# Patient Record
Sex: MALE | Race: WHITE | Employment: OTHER
[De-identification: names, ages, dates, MRNs, and addresses within clinical notes are randomized per-mention and may not be internally consistent; named-entity substitution may affect disease eponyms.]

---

## 2017-01-30 ENCOUNTER — PRIOR ORIGINAL RECORDS (OUTPATIENT)
Dept: OTHER | Age: 77
End: 2017-01-30

## 2017-01-31 LAB — INR: 2.43

## 2017-02-28 ENCOUNTER — PRIOR ORIGINAL RECORDS (OUTPATIENT)
Dept: OTHER | Age: 77
End: 2017-02-28

## 2017-03-01 LAB
INR: 2.33
PROTHROMBIN TIME: 25.9 SECONDS

## 2017-03-29 ENCOUNTER — PRIOR ORIGINAL RECORDS (OUTPATIENT)
Dept: OTHER | Age: 77
End: 2017-03-29

## 2017-03-30 LAB — INR: 2.06

## 2017-04-17 ENCOUNTER — PRIOR ORIGINAL RECORDS (OUTPATIENT)
Dept: OTHER | Age: 77
End: 2017-04-17

## 2017-04-25 ENCOUNTER — PRIOR ORIGINAL RECORDS (OUTPATIENT)
Dept: OTHER | Age: 77
End: 2017-04-25

## 2017-04-26 LAB — INR: 2.66

## 2017-05-31 ENCOUNTER — PRIOR ORIGINAL RECORDS (OUTPATIENT)
Dept: OTHER | Age: 77
End: 2017-05-31

## 2017-06-01 LAB — INR: 2.17

## 2017-06-02 ENCOUNTER — PRIOR ORIGINAL RECORDS (OUTPATIENT)
Dept: OTHER | Age: 77
End: 2017-06-02

## 2017-06-09 ENCOUNTER — PRIOR ORIGINAL RECORDS (OUTPATIENT)
Dept: OTHER | Age: 77
End: 2017-06-09

## 2017-06-09 LAB
ALBUMIN: 4.1 G/DL
ALKALINE PHOSPHATATE(ALK PHOS): 212 IU/L
ALT (SGPT): 34 U/L
AST (SGOT): 23 U/L
BILIRUBIN TOTAL: 0.5 MG/DL
BUN: 19 MG/DL
CALCIUM: 9 MG/DL
CHLORIDE: 102 MEQ/L
CHOLESTEROL, TOTAL: 159 MG/DL
CREATININE, SERUM: 0.76 MG/DL
GLOBULIN: 3.5 G/DL
GLUCOSE: 102 MG/DL
GLUCOSE: 102 MG/DL
HDL CHOLESTEROL: 70 MG/DL
HEMATOCRIT: 44.2 %
HEMOGLOBIN: 14.6 G/DL
LDL CHOLESTEROL: 78 MG/DL
NON-HDL CHOLESTEROL: 89 MG/DL
PLATELETS: 146 K/UL
POTASSIUM, SERUM: 4.2 MEQ/L
PROTEIN, TOTAL: 7.6 G/DL
RED BLOOD COUNT: 4.68 X 10-6/U
SGOT (AST): 23 IU/L
SGPT (ALT): 34 IU/L
SODIUM: 139 MEQ/L
TRIGLYCERIDES: 55 MG/DL
WHITE BLOOD COUNT: 7.5 X 10-3/U

## 2017-06-28 ENCOUNTER — PRIOR ORIGINAL RECORDS (OUTPATIENT)
Dept: OTHER | Age: 77
End: 2017-06-28

## 2017-06-29 LAB
INR: 2.08
PROTHROMBIN TIME: 24 SECONDS

## 2017-07-26 ENCOUNTER — PRIOR ORIGINAL RECORDS (OUTPATIENT)
Dept: OTHER | Age: 77
End: 2017-07-26

## 2017-07-27 LAB — INR: 2.05

## 2017-08-30 ENCOUNTER — PRIOR ORIGINAL RECORDS (OUTPATIENT)
Dept: OTHER | Age: 77
End: 2017-08-30

## 2017-08-31 LAB
INR: 2.04
PROTHROMBIN TIME: 23.6 SECONDS

## 2017-09-27 ENCOUNTER — PRIOR ORIGINAL RECORDS (OUTPATIENT)
Dept: OTHER | Age: 77
End: 2017-09-27

## 2017-09-28 LAB — INR: 2.33

## 2017-11-10 ENCOUNTER — PRIOR ORIGINAL RECORDS (OUTPATIENT)
Dept: OTHER | Age: 77
End: 2017-11-10

## 2017-11-13 LAB — INR: 2.24

## 2017-12-08 ENCOUNTER — PRIOR ORIGINAL RECORDS (OUTPATIENT)
Dept: OTHER | Age: 77
End: 2017-12-08

## 2017-12-11 ENCOUNTER — PRIOR ORIGINAL RECORDS (OUTPATIENT)
Dept: OTHER | Age: 77
End: 2017-12-11

## 2017-12-12 LAB
INR: 2.83
PROTHROMBIN TIME: 30.5 SECONDS

## 2018-01-31 ENCOUNTER — PRIOR ORIGINAL RECORDS (OUTPATIENT)
Dept: OTHER | Age: 78
End: 2018-01-31

## 2018-04-20 ENCOUNTER — PRIOR ORIGINAL RECORDS (OUTPATIENT)
Dept: OTHER | Age: 78
End: 2018-04-20

## 2018-04-20 LAB — INR: 2.6

## 2018-04-23 PROBLEM — Z00.00 ADULT GENERAL MEDICAL EXAMINATION: Status: ACTIVE | Noted: 2017-07-14

## 2018-04-27 ENCOUNTER — PRIOR ORIGINAL RECORDS (OUTPATIENT)
Dept: OTHER | Age: 78
End: 2018-04-27

## 2018-04-27 LAB — INR: 2.8

## 2018-05-11 ENCOUNTER — PRIOR ORIGINAL RECORDS (OUTPATIENT)
Dept: OTHER | Age: 78
End: 2018-05-11

## 2018-05-11 LAB — INR: 3.9

## 2018-05-18 ENCOUNTER — PRIOR ORIGINAL RECORDS (OUTPATIENT)
Dept: OTHER | Age: 78
End: 2018-05-18

## 2018-05-18 LAB — INR: 3.6

## 2018-06-04 ENCOUNTER — PRIOR ORIGINAL RECORDS (OUTPATIENT)
Dept: OTHER | Age: 78
End: 2018-06-04

## 2018-06-08 ENCOUNTER — PRIOR ORIGINAL RECORDS (OUTPATIENT)
Dept: OTHER | Age: 78
End: 2018-06-08

## 2018-06-08 LAB — INR: 1.9

## 2018-06-15 ENCOUNTER — PRIOR ORIGINAL RECORDS (OUTPATIENT)
Dept: OTHER | Age: 78
End: 2018-06-15

## 2018-06-18 ENCOUNTER — PRIOR ORIGINAL RECORDS (OUTPATIENT)
Dept: OTHER | Age: 78
End: 2018-06-18

## 2018-06-18 LAB
INR: 1.72
PROTHROMBIN TIME: 20.7 SECONDS

## 2018-06-19 PROBLEM — J18.9 PNEUMONIA OF RIGHT LOWER LOBE DUE TO INFECTIOUS ORGANISM: Status: ACTIVE | Noted: 2018-06-19

## 2018-06-19 PROBLEM — I50.33 ACUTE ON CHRONIC DIASTOLIC CONGESTIVE HEART FAILURE (HCC): Status: ACTIVE | Noted: 2018-06-19

## 2018-06-25 ENCOUNTER — PRIOR ORIGINAL RECORDS (OUTPATIENT)
Dept: OTHER | Age: 78
End: 2018-06-25

## 2018-06-26 LAB
INR: 1.86
PROTHROMBIN TIME: 22 SECONDS

## 2018-07-02 ENCOUNTER — PRIOR ORIGINAL RECORDS (OUTPATIENT)
Dept: OTHER | Age: 78
End: 2018-07-02

## 2018-07-03 LAB — INR: 2.08

## 2018-07-10 LAB
CHOLESTEROL, TOTAL: 130 MG/DL
HDL CHOLESTEROL: 58 MG/DL
LDL CHOLESTEROL: 62 MG/DL
TRIGLYCERIDES: 51 MG/DL

## 2018-07-16 ENCOUNTER — PRIOR ORIGINAL RECORDS (OUTPATIENT)
Dept: OTHER | Age: 78
End: 2018-07-16

## 2018-07-17 LAB
INR: 2.41
PROTHROMBIN TIME: 26.9 SECONDS

## 2018-07-24 ENCOUNTER — PRIOR ORIGINAL RECORDS (OUTPATIENT)
Dept: OTHER | Age: 78
End: 2018-07-24

## 2018-08-07 ENCOUNTER — PRIOR ORIGINAL RECORDS (OUTPATIENT)
Dept: OTHER | Age: 78
End: 2018-08-07

## 2018-08-09 ENCOUNTER — PRIOR ORIGINAL RECORDS (OUTPATIENT)
Dept: OTHER | Age: 78
End: 2018-08-09

## 2018-08-10 LAB
INR: 1.9
PROTHROMBIN TIME: 22.6 SECONDS

## 2018-08-23 ENCOUNTER — PRIOR ORIGINAL RECORDS (OUTPATIENT)
Dept: OTHER | Age: 78
End: 2018-08-23

## 2018-08-23 PROCEDURE — 81001 URINALYSIS AUTO W/SCOPE: CPT | Performed by: INTERNAL MEDICINE

## 2018-08-23 PROCEDURE — 87086 URINE CULTURE/COLONY COUNT: CPT | Performed by: INTERNAL MEDICINE

## 2018-08-24 LAB
INR: 1.89
PROTHROMBIN TIME: 22.3 SECONDS

## 2018-09-06 ENCOUNTER — PRIOR ORIGINAL RECORDS (OUTPATIENT)
Dept: OTHER | Age: 78
End: 2018-09-06

## 2018-09-07 LAB — INR: 2.5

## 2018-10-04 ENCOUNTER — PRIOR ORIGINAL RECORDS (OUTPATIENT)
Dept: OTHER | Age: 78
End: 2018-10-04

## 2018-10-05 LAB — INR: 2.49

## 2018-10-29 ENCOUNTER — PRIOR ORIGINAL RECORDS (OUTPATIENT)
Dept: OTHER | Age: 78
End: 2018-10-29

## 2018-10-30 PROBLEM — S82.899A ANKLE FRACTURE: Status: ACTIVE | Noted: 2018-01-01

## 2018-10-30 PROBLEM — S62.109A WRIST FRACTURE: Status: ACTIVE | Noted: 2018-01-01

## 2018-10-30 PROBLEM — J18.9 PNEUMONIA OF RIGHT LOWER LOBE DUE TO INFECTIOUS ORGANISM: Status: RESOLVED | Noted: 2018-06-19 | Resolved: 2018-10-30

## 2018-10-30 LAB
INR: 2.69
PROTHROMBIN TIME: 28.1 SECONDS

## 2018-11-08 ENCOUNTER — PRIOR ORIGINAL RECORDS (OUTPATIENT)
Dept: OTHER | Age: 78
End: 2018-11-08

## 2018-11-08 PROCEDURE — 81001 URINALYSIS AUTO W/SCOPE: CPT | Performed by: INTERNAL MEDICINE

## 2018-11-28 ENCOUNTER — PRIOR ORIGINAL RECORDS (OUTPATIENT)
Dept: OTHER | Age: 78
End: 2018-11-28

## 2018-11-29 LAB — INR: 2.63

## 2018-12-05 LAB
ALT (SGPT): 37 U/L
AST (SGOT): 29 U/L
CHOLESTEROL, TOTAL: 163 MG/DL
GLUCOSE: 108 MG/DL
HDL CHOLESTEROL: 68 MG/DL
LDL CHOLESTEROL: 87 MG/DL
TRIGLYCERIDES: 39 MG/DL

## 2018-12-21 ENCOUNTER — PRIOR ORIGINAL RECORDS (OUTPATIENT)
Dept: OTHER | Age: 78
End: 2018-12-21

## 2018-12-21 ENCOUNTER — MYAURORA ACCOUNT LINK (OUTPATIENT)
Dept: OTHER | Age: 78
End: 2018-12-21

## 2019-01-17 ENCOUNTER — PRIOR ORIGINAL RECORDS (OUTPATIENT)
Dept: OTHER | Age: 79
End: 2019-01-17

## 2019-01-18 LAB — INR: 2.27

## 2019-02-21 ENCOUNTER — PRIOR ORIGINAL RECORDS (OUTPATIENT)
Dept: OTHER | Age: 79
End: 2019-02-21

## 2019-02-22 LAB
INR: 2.38
PROTHROMBIN TIME: 25.6 SECONDS

## 2019-02-28 VITALS
BODY MASS INDEX: 29.84 KG/M2 | HEIGHT: 75 IN | OXYGEN SATURATION: 91 % | DIASTOLIC BLOOD PRESSURE: 60 MMHG | WEIGHT: 240 LBS | SYSTOLIC BLOOD PRESSURE: 92 MMHG | HEART RATE: 58 BPM

## 2019-02-28 VITALS
WEIGHT: 200 LBS | DIASTOLIC BLOOD PRESSURE: 64 MMHG | SYSTOLIC BLOOD PRESSURE: 110 MMHG | BODY MASS INDEX: 24.87 KG/M2 | HEIGHT: 75 IN | HEART RATE: 60 BPM | RESPIRATION RATE: 18 BRPM

## 2019-02-28 VITALS
BODY MASS INDEX: 30.46 KG/M2 | SYSTOLIC BLOOD PRESSURE: 120 MMHG | HEART RATE: 58 BPM | WEIGHT: 245 LBS | DIASTOLIC BLOOD PRESSURE: 60 MMHG | OXYGEN SATURATION: 95 % | HEIGHT: 75 IN

## 2019-03-01 VITALS
WEIGHT: 241 LBS | HEART RATE: 64 BPM | SYSTOLIC BLOOD PRESSURE: 110 MMHG | DIASTOLIC BLOOD PRESSURE: 70 MMHG | HEIGHT: 75 IN | BODY MASS INDEX: 29.97 KG/M2

## 2019-03-21 LAB — INR PPP: 2.36

## 2019-03-22 ENCOUNTER — ANTI-COAG (OUTPATIENT)
Dept: CARDIOLOGY | Age: 79
End: 2019-03-22

## 2019-03-22 DIAGNOSIS — Z79.01 LONG TERM (CURRENT) USE OF ANTICOAGULANTS: ICD-10-CM

## 2019-03-22 DIAGNOSIS — I48.91 ATRIAL FIBRILLATION, UNSPECIFIED TYPE (CMD): ICD-10-CM

## 2019-03-22 DIAGNOSIS — Z51.81 ENCOUNTER FOR THERAPEUTIC DRUG MONITORING: ICD-10-CM

## 2019-03-22 DIAGNOSIS — I82.409 DEEP VEIN THROMBOSIS (DVT) OF LOWER EXTREMITY, UNSPECIFIED CHRONICITY, UNSPECIFIED LATERALITY, UNSPECIFIED VEIN (CMD): ICD-10-CM

## 2019-04-17 LAB — INR PPP: 2.58

## 2019-04-22 ENCOUNTER — ANTI-COAG (OUTPATIENT)
Dept: CARDIOLOGY | Age: 79
End: 2019-04-22

## 2019-04-22 DIAGNOSIS — I48.91 ATRIAL FIBRILLATION, UNSPECIFIED TYPE (CMD): ICD-10-CM

## 2019-04-22 DIAGNOSIS — Z79.01 LONG TERM (CURRENT) USE OF ANTICOAGULANTS: ICD-10-CM

## 2019-04-22 DIAGNOSIS — I82.409 DEEP VEIN THROMBOSIS (DVT) OF LOWER EXTREMITY, UNSPECIFIED CHRONICITY, UNSPECIFIED LATERALITY, UNSPECIFIED VEIN (CMD): ICD-10-CM

## 2019-04-22 DIAGNOSIS — Z51.81 ENCOUNTER FOR THERAPEUTIC DRUG MONITORING: ICD-10-CM

## 2019-05-16 LAB — INR PPP: 2.84

## 2019-05-17 ENCOUNTER — ANTI-COAG (OUTPATIENT)
Dept: CARDIOLOGY | Age: 79
End: 2019-05-17

## 2019-05-17 DIAGNOSIS — I48.91 ATRIAL FIBRILLATION, UNSPECIFIED TYPE (CMD): ICD-10-CM

## 2019-05-17 DIAGNOSIS — I82.409 DEEP VEIN THROMBOSIS (DVT) OF LOWER EXTREMITY, UNSPECIFIED CHRONICITY, UNSPECIFIED LATERALITY, UNSPECIFIED VEIN (CMD): ICD-10-CM

## 2019-05-17 DIAGNOSIS — Z79.01 LONG TERM (CURRENT) USE OF ANTICOAGULANTS: ICD-10-CM

## 2019-05-17 DIAGNOSIS — Z51.81 ENCOUNTER FOR THERAPEUTIC DRUG MONITORING: ICD-10-CM

## 2019-05-20 PROCEDURE — 36415 COLL VENOUS BLD VENIPUNCTURE: CPT | Performed by: INTERNAL MEDICINE

## 2019-05-20 PROCEDURE — 84075 ASSAY ALKALINE PHOSPHATASE: CPT | Performed by: INTERNAL MEDICINE

## 2019-05-20 PROCEDURE — 84080 ASSAY ALKALINE PHOSPHATASES: CPT | Performed by: INTERNAL MEDICINE

## 2019-05-24 RX ORDER — METOPROLOL TARTRATE 50 MG/1
TABLET, FILM COATED ORAL
Qty: 180 TABLET | Refills: 0 | Status: SHIPPED | OUTPATIENT
Start: 2019-05-24 | End: 2019-08-18 | Stop reason: SDUPTHER

## 2019-05-24 RX ORDER — DILTIAZEM HYDROCHLORIDE 240 MG/1
CAPSULE, COATED, EXTENDED RELEASE ORAL
Qty: 90 CAPSULE | Refills: 0 | Status: SHIPPED | OUTPATIENT
Start: 2019-05-24 | End: 2019-08-18 | Stop reason: SDUPTHER

## 2019-05-24 RX ORDER — DIGOXIN 250 UG/1
TABLET ORAL
Qty: 90 TABLET | Refills: 0 | Status: SHIPPED | OUTPATIENT
Start: 2019-05-24 | End: 2019-08-18 | Stop reason: SDUPTHER

## 2019-05-24 RX ORDER — WARFARIN SODIUM 2 MG/1
TABLET ORAL
Qty: 270 TABLET | Refills: 0 | Status: SHIPPED | OUTPATIENT
Start: 2019-05-24 | End: 2019-08-18 | Stop reason: SDUPTHER

## 2019-06-12 PROBLEM — Z95.2 S/P AVR (AORTIC VALVE REPLACEMENT) AND AORTOPLASTY: Status: ACTIVE | Noted: 2019-06-12

## 2019-06-13 ENCOUNTER — OFFICE VISIT (OUTPATIENT)
Dept: CARDIOLOGY | Age: 79
End: 2019-06-13

## 2019-06-13 VITALS
HEIGHT: 74 IN | BODY MASS INDEX: 30.03 KG/M2 | OXYGEN SATURATION: 95 % | HEART RATE: 75 BPM | SYSTOLIC BLOOD PRESSURE: 126 MMHG | WEIGHT: 234 LBS | DIASTOLIC BLOOD PRESSURE: 80 MMHG

## 2019-06-13 DIAGNOSIS — I48.21 PERMANENT ATRIAL FIBRILLATION (CMD): Primary | ICD-10-CM

## 2019-06-13 DIAGNOSIS — I82.409 DEEP VEIN THROMBOSIS (DVT) OF LOWER EXTREMITY, UNSPECIFIED CHRONICITY, UNSPECIFIED LATERALITY, UNSPECIFIED VEIN (CMD): ICD-10-CM

## 2019-06-13 DIAGNOSIS — E78.2 HYPERLIPIDEMIA, MIXED: ICD-10-CM

## 2019-06-13 DIAGNOSIS — Z95.2 S/P AVR (AORTIC VALVE REPLACEMENT) AND AORTOPLASTY: ICD-10-CM

## 2019-06-13 DIAGNOSIS — I10 ESSENTIAL HYPERTENSION: ICD-10-CM

## 2019-06-13 LAB — INR PPP: 3.31

## 2019-06-13 PROCEDURE — 99214 OFFICE O/P EST MOD 30 MIN: CPT | Performed by: INTERNAL MEDICINE

## 2019-06-13 RX ORDER — FUROSEMIDE 40 MG/1
40 TABLET ORAL 2 TIMES DAILY
COMMUNITY
End: 2021-05-11 | Stop reason: SDUPTHER

## 2019-06-13 RX ORDER — POTASSIUM CHLORIDE 20 MEQ/1
20 TABLET, EXTENDED RELEASE ORAL 2 TIMES DAILY
COMMUNITY
End: 2021-05-11 | Stop reason: SDUPTHER

## 2019-06-13 RX ORDER — PHENOBARBITAL 64.8 MG/1
64.8 TABLET ORAL 2 TIMES DAILY
COMMUNITY

## 2019-06-13 RX ORDER — PRAVASTATIN SODIUM 40 MG
40 TABLET ORAL DAILY
COMMUNITY

## 2019-06-13 RX ORDER — PHENYTOIN SODIUM 100 MG/1
100 CAPSULE, EXTENDED RELEASE ORAL 3 TIMES DAILY
COMMUNITY

## 2019-06-13 ASSESSMENT — ENCOUNTER SYMPTOMS
DIZZINESS: 0
SHORTNESS OF BREATH: 1
FEVER: 0
LOSS OF BALANCE: 1
SUSPICIOUS LESIONS: 0
WEIGHT LOSS: 0
COUGH: 0
CHILLS: 0
LIGHT-HEADEDNESS: 0
HEMOPTYSIS: 0
DECREASED APPETITE: 0
WEIGHT GAIN: 0
BRUISES/BLEEDS EASILY: 0

## 2019-06-17 ENCOUNTER — ANTI-COAG (OUTPATIENT)
Dept: CARDIOLOGY | Age: 79
End: 2019-06-17

## 2019-06-17 DIAGNOSIS — I48.91 ATRIAL FIBRILLATION, UNSPECIFIED TYPE (CMD): ICD-10-CM

## 2019-06-17 DIAGNOSIS — I82.A29 CHRONIC DEEP VEIN THROMBOSIS (DVT) OF AXILLARY VEIN, UNSPECIFIED LATERALITY (CMD): ICD-10-CM

## 2019-06-17 DIAGNOSIS — Z79.01 LONG TERM (CURRENT) USE OF ANTICOAGULANTS: ICD-10-CM

## 2019-06-17 DIAGNOSIS — Z51.81 ENCOUNTER FOR THERAPEUTIC DRUG MONITORING: ICD-10-CM

## 2019-06-26 LAB — INR PPP: 2.89

## 2019-06-27 ENCOUNTER — ANTI-COAG (OUTPATIENT)
Dept: CARDIOLOGY | Age: 79
End: 2019-06-27

## 2019-06-27 DIAGNOSIS — Z51.81 ENCOUNTER FOR THERAPEUTIC DRUG MONITORING: ICD-10-CM

## 2019-06-27 DIAGNOSIS — Z79.01 LONG TERM (CURRENT) USE OF ANTICOAGULANTS: ICD-10-CM

## 2019-06-27 DIAGNOSIS — I82.A29 CHRONIC DEEP VEIN THROMBOSIS (DVT) OF AXILLARY VEIN, UNSPECIFIED LATERALITY (CMD): ICD-10-CM

## 2019-06-27 DIAGNOSIS — I48.91 ATRIAL FIBRILLATION, UNSPECIFIED TYPE (CMD): ICD-10-CM

## 2019-07-25 LAB — INTERNATIONAL NORMALIZATION RATIO, POC: 2.58 (ref 2–3)

## 2019-07-26 ENCOUNTER — ANTI-COAG (OUTPATIENT)
Dept: CARDIOLOGY | Age: 79
End: 2019-07-26

## 2019-07-26 DIAGNOSIS — I82.A29 CHRONIC DEEP VEIN THROMBOSIS (DVT) OF AXILLARY VEIN, UNSPECIFIED LATERALITY (CMD): ICD-10-CM

## 2019-07-26 DIAGNOSIS — Z79.01 LONG TERM (CURRENT) USE OF ANTICOAGULANTS: ICD-10-CM

## 2019-07-26 DIAGNOSIS — I48.91 ATRIAL FIBRILLATION, UNSPECIFIED TYPE (CMD): ICD-10-CM

## 2019-07-26 DIAGNOSIS — Z51.81 ENCOUNTER FOR THERAPEUTIC DRUG MONITORING: ICD-10-CM

## 2019-07-26 PROCEDURE — X1094 POCT INR: HCPCS | Performed by: INTERNAL MEDICINE

## 2019-08-19 RX ORDER — DIGOXIN 250 UG/1
TABLET ORAL
Qty: 90 TABLET | Refills: 0 | Status: SHIPPED | OUTPATIENT
Start: 2019-08-19 | End: 2019-11-16 | Stop reason: SDUPTHER

## 2019-08-19 RX ORDER — WARFARIN SODIUM 2 MG/1
TABLET ORAL
Qty: 270 TABLET | Refills: 0 | Status: SHIPPED | OUTPATIENT
Start: 2019-08-19 | End: 2019-11-16 | Stop reason: SDUPTHER

## 2019-08-19 RX ORDER — METOPROLOL TARTRATE 50 MG/1
TABLET, FILM COATED ORAL
Qty: 180 TABLET | Refills: 0 | Status: SHIPPED | OUTPATIENT
Start: 2019-08-19 | End: 2019-11-16 | Stop reason: SDUPTHER

## 2019-08-19 RX ORDER — DILTIAZEM HYDROCHLORIDE 240 MG/1
CAPSULE, COATED, EXTENDED RELEASE ORAL
Qty: 90 CAPSULE | Refills: 0 | Status: SHIPPED | OUTPATIENT
Start: 2019-08-19 | End: 2019-11-16 | Stop reason: SDUPTHER

## 2019-08-22 LAB — INR PPP: 3.12

## 2019-08-26 ENCOUNTER — ANTI-COAG (OUTPATIENT)
Dept: CARDIOLOGY | Age: 79
End: 2019-08-26

## 2019-08-26 DIAGNOSIS — I48.91 ATRIAL FIBRILLATION, UNSPECIFIED TYPE (CMD): ICD-10-CM

## 2019-08-26 DIAGNOSIS — Z79.01 LONG TERM (CURRENT) USE OF ANTICOAGULANTS: ICD-10-CM

## 2019-08-26 DIAGNOSIS — Z51.81 ENCOUNTER FOR THERAPEUTIC DRUG MONITORING: ICD-10-CM

## 2019-09-05 LAB — INR PPP: 2.65

## 2019-09-06 ENCOUNTER — ANTI-COAG (OUTPATIENT)
Dept: CARDIOLOGY | Age: 79
End: 2019-09-06

## 2019-09-06 DIAGNOSIS — I48.91 ATRIAL FIBRILLATION, UNSPECIFIED TYPE (CMD): ICD-10-CM

## 2019-09-06 DIAGNOSIS — Z51.81 ENCOUNTER FOR THERAPEUTIC DRUG MONITORING: ICD-10-CM

## 2019-09-06 DIAGNOSIS — Z79.01 LONG TERM (CURRENT) USE OF ANTICOAGULANTS: ICD-10-CM

## 2019-09-19 LAB — INR PPP: 2.62

## 2019-09-20 ENCOUNTER — ANTI-COAG (OUTPATIENT)
Dept: CARDIOLOGY | Age: 79
End: 2019-09-20

## 2019-09-20 DIAGNOSIS — Z51.81 ENCOUNTER FOR THERAPEUTIC DRUG MONITORING: ICD-10-CM

## 2019-09-20 DIAGNOSIS — Z79.01 LONG TERM (CURRENT) USE OF ANTICOAGULANTS: ICD-10-CM

## 2019-09-20 DIAGNOSIS — I48.91 ATRIAL FIBRILLATION, UNSPECIFIED TYPE (CMD): ICD-10-CM

## 2019-10-17 LAB — INR PPP: 3.29

## 2019-10-18 ENCOUNTER — ANTI-COAG (OUTPATIENT)
Dept: CARDIOLOGY | Age: 79
End: 2019-10-18

## 2019-10-18 ENCOUNTER — TELEPHONE (OUTPATIENT)
Dept: CARDIOLOGY | Age: 79
End: 2019-10-18

## 2019-10-18 DIAGNOSIS — Z79.01 LONG TERM (CURRENT) USE OF ANTICOAGULANTS: ICD-10-CM

## 2019-10-18 DIAGNOSIS — I48.91 ATRIAL FIBRILLATION, UNSPECIFIED TYPE (CMD): ICD-10-CM

## 2019-10-18 DIAGNOSIS — Z51.81 ENCOUNTER FOR THERAPEUTIC DRUG MONITORING: ICD-10-CM

## 2019-10-24 LAB — INR PPP: 2.54

## 2019-10-25 ENCOUNTER — ANTI-COAG (OUTPATIENT)
Dept: CARDIOLOGY | Age: 79
End: 2019-10-25

## 2019-10-25 DIAGNOSIS — I48.91 ATRIAL FIBRILLATION, UNSPECIFIED TYPE (CMD): ICD-10-CM

## 2019-10-25 DIAGNOSIS — Z51.81 ENCOUNTER FOR THERAPEUTIC DRUG MONITORING: ICD-10-CM

## 2019-10-25 DIAGNOSIS — Z79.01 LONG TERM (CURRENT) USE OF ANTICOAGULANTS: ICD-10-CM

## 2019-11-12 ENCOUNTER — EXTERNAL RECORD (OUTPATIENT)
Dept: HEALTH INFORMATION MANAGEMENT | Facility: OTHER | Age: 79
End: 2019-11-12

## 2019-11-15 LAB — INR PPP: 2.25

## 2019-11-17 ENCOUNTER — EXTERNAL RECORD (OUTPATIENT)
Dept: HEALTH INFORMATION MANAGEMENT | Facility: OTHER | Age: 79
End: 2019-11-17

## 2019-11-17 PROCEDURE — 93010 ELECTROCARDIOGRAM REPORT: CPT | Performed by: INTERNAL MEDICINE

## 2019-11-18 ENCOUNTER — ANTI-COAG (OUTPATIENT)
Dept: CARDIOLOGY | Age: 79
End: 2019-11-18

## 2019-11-18 DIAGNOSIS — I48.91 ATRIAL FIBRILLATION, UNSPECIFIED TYPE (CMD): ICD-10-CM

## 2019-11-18 DIAGNOSIS — Z51.81 ENCOUNTER FOR THERAPEUTIC DRUG MONITORING: ICD-10-CM

## 2019-11-18 DIAGNOSIS — Z79.01 LONG TERM (CURRENT) USE OF ANTICOAGULANTS: ICD-10-CM

## 2019-11-18 PROCEDURE — 99222 1ST HOSP IP/OBS MODERATE 55: CPT | Performed by: INTERNAL MEDICINE

## 2019-11-18 RX ORDER — DILTIAZEM HYDROCHLORIDE 240 MG/1
CAPSULE, COATED, EXTENDED RELEASE ORAL
Qty: 90 CAPSULE | Refills: 1 | Status: SHIPPED | OUTPATIENT
Start: 2019-11-18 | End: 2019-12-05

## 2019-11-18 RX ORDER — METOPROLOL TARTRATE 50 MG/1
TABLET, FILM COATED ORAL
Qty: 180 TABLET | Refills: 1 | Status: SHIPPED | OUTPATIENT
Start: 2019-11-18 | End: 2020-05-14

## 2019-11-18 RX ORDER — DIGOXIN 250 MCG
TABLET ORAL
Qty: 90 TABLET | Refills: 1 | Status: SHIPPED | OUTPATIENT
Start: 2019-11-18 | End: 2020-05-14 | Stop reason: SDUPTHER

## 2019-11-18 RX ORDER — WARFARIN SODIUM 2 MG/1
TABLET ORAL
Qty: 270 TABLET | Refills: 1 | Status: SHIPPED | OUTPATIENT
Start: 2019-11-18 | End: 2020-04-16

## 2019-11-19 PROCEDURE — 99232 SBSQ HOSP IP/OBS MODERATE 35: CPT | Performed by: NURSE PRACTITIONER

## 2019-11-20 PROCEDURE — 93306 TTE W/DOPPLER COMPLETE: CPT | Performed by: INTERNAL MEDICINE

## 2019-11-20 PROCEDURE — 99232 SBSQ HOSP IP/OBS MODERATE 35: CPT | Performed by: INTERNAL MEDICINE

## 2019-11-21 PROCEDURE — 99232 SBSQ HOSP IP/OBS MODERATE 35: CPT | Performed by: INTERNAL MEDICINE

## 2019-11-22 PROCEDURE — 93320 DOPPLER ECHO COMPLETE: CPT | Performed by: INTERNAL MEDICINE

## 2019-11-22 PROCEDURE — 93325 DOPPLER ECHO COLOR FLOW MAPG: CPT | Performed by: INTERNAL MEDICINE

## 2019-11-22 PROCEDURE — 93312 ECHO TRANSESOPHAGEAL: CPT | Performed by: INTERNAL MEDICINE

## 2019-11-23 PROCEDURE — 99232 SBSQ HOSP IP/OBS MODERATE 35: CPT | Performed by: INTERNAL MEDICINE

## 2019-11-24 PROCEDURE — 99232 SBSQ HOSP IP/OBS MODERATE 35: CPT | Performed by: INTERNAL MEDICINE

## 2019-11-25 ENCOUNTER — APPOINTMENT (OUTPATIENT)
Dept: CARDIOLOGY | Age: 79
End: 2019-11-25

## 2019-11-25 PROCEDURE — 99232 SBSQ HOSP IP/OBS MODERATE 35: CPT | Performed by: INTERNAL MEDICINE

## 2019-11-27 ENCOUNTER — ANTI-COAG (OUTPATIENT)
Dept: CARDIOLOGY | Age: 79
End: 2019-11-27

## 2019-11-27 ENCOUNTER — TELEPHONE (OUTPATIENT)
Dept: CARDIOLOGY | Age: 79
End: 2019-11-27

## 2019-11-27 DIAGNOSIS — Z51.81 ENCOUNTER FOR THERAPEUTIC DRUG MONITORING: ICD-10-CM

## 2019-11-27 DIAGNOSIS — I48.91 ATRIAL FIBRILLATION, UNSPECIFIED TYPE (CMD): ICD-10-CM

## 2019-11-27 DIAGNOSIS — I82.4Y9 DEEP VEIN THROMBOSIS (DVT) OF PROXIMAL LOWER EXTREMITY, UNSPECIFIED CHRONICITY, UNSPECIFIED LATERALITY (CMD): ICD-10-CM

## 2019-11-27 DIAGNOSIS — Z79.01 LONG TERM (CURRENT) USE OF ANTICOAGULANTS: ICD-10-CM

## 2019-11-27 LAB — INTERNATIONAL NORMALIZATION RATIO, POC: 1.9 (ref 2–3)

## 2019-11-27 PROCEDURE — X1094 POCT INR: HCPCS | Performed by: INTERNAL MEDICINE

## 2019-12-02 ENCOUNTER — ANTI-COAG (OUTPATIENT)
Dept: CARDIOLOGY | Age: 79
End: 2019-12-02

## 2019-12-02 ENCOUNTER — TELEPHONE (OUTPATIENT)
Dept: CARDIOLOGY | Age: 79
End: 2019-12-02

## 2019-12-02 DIAGNOSIS — Z79.01 LONG TERM (CURRENT) USE OF ANTICOAGULANTS: ICD-10-CM

## 2019-12-02 DIAGNOSIS — Z51.81 ENCOUNTER FOR THERAPEUTIC DRUG MONITORING: ICD-10-CM

## 2019-12-02 LAB — INR PPP: 1.7

## 2019-12-02 RX ORDER — WARFARIN SODIUM 4 MG/1
TABLET ORAL
Qty: 90 TABLET | Refills: 3 | Status: SHIPPED | OUTPATIENT
Start: 2019-12-02 | End: 2020-04-16

## 2019-12-05 ENCOUNTER — OFFICE VISIT (OUTPATIENT)
Dept: CARDIOLOGY | Age: 79
End: 2019-12-05

## 2019-12-05 VITALS
WEIGHT: 222.6 LBS | BODY MASS INDEX: 28.57 KG/M2 | OXYGEN SATURATION: 92 % | DIASTOLIC BLOOD PRESSURE: 56 MMHG | HEART RATE: 65 BPM | HEIGHT: 74 IN | SYSTOLIC BLOOD PRESSURE: 98 MMHG

## 2019-12-05 DIAGNOSIS — Z95.2 S/P AVR (AORTIC VALVE REPLACEMENT) AND AORTOPLASTY: ICD-10-CM

## 2019-12-05 DIAGNOSIS — I27.20 PULMONARY HTN (CMD): ICD-10-CM

## 2019-12-05 DIAGNOSIS — I48.91 ATRIAL FIBRILLATION, UNSPECIFIED TYPE (CMD): Primary | ICD-10-CM

## 2019-12-05 DIAGNOSIS — E78.2 HYPERLIPIDEMIA, MIXED: ICD-10-CM

## 2019-12-05 DIAGNOSIS — I10 ESSENTIAL HYPERTENSION: ICD-10-CM

## 2019-12-05 PROCEDURE — 99214 OFFICE O/P EST MOD 30 MIN: CPT | Performed by: INTERNAL MEDICINE

## 2019-12-05 RX ORDER — TAMSULOSIN HYDROCHLORIDE 0.4 MG/1
0.4 CAPSULE ORAL
COMMUNITY
End: 2021-09-24

## 2019-12-05 ASSESSMENT — ENCOUNTER SYMPTOMS
LIGHT-HEADEDNESS: 0
WEIGHT GAIN: 0
HEMATOCHEZIA: 0
COUGH: 0
FEVER: 0
DIZZINESS: 0
HEMOPTYSIS: 0
ALLERGIC/IMMUNOLOGIC COMMENTS: NO NEW FOOD ALLERGIES
CHILLS: 0
WEIGHT LOSS: 0
SUSPICIOUS LESIONS: 0
BRUISES/BLEEDS EASILY: 0

## 2019-12-09 ENCOUNTER — ANTI-COAG (OUTPATIENT)
Dept: CARDIOLOGY | Age: 79
End: 2019-12-09

## 2019-12-09 ENCOUNTER — TELEPHONE (OUTPATIENT)
Dept: CARDIOLOGY | Age: 79
End: 2019-12-09

## 2019-12-09 DIAGNOSIS — Z51.81 ENCOUNTER FOR THERAPEUTIC DRUG MONITORING: ICD-10-CM

## 2019-12-09 DIAGNOSIS — Z79.01 LONG TERM (CURRENT) USE OF ANTICOAGULANTS: ICD-10-CM

## 2019-12-09 LAB — INR PPP: 1.6

## 2019-12-16 ENCOUNTER — ANTI-COAG (OUTPATIENT)
Dept: CARDIOLOGY | Age: 79
End: 2019-12-16

## 2019-12-16 ENCOUNTER — TELEPHONE (OUTPATIENT)
Dept: CARDIOLOGY | Age: 79
End: 2019-12-16

## 2019-12-16 DIAGNOSIS — Z79.01 LONG TERM (CURRENT) USE OF ANTICOAGULANTS: ICD-10-CM

## 2019-12-16 DIAGNOSIS — Z51.81 ENCOUNTER FOR THERAPEUTIC DRUG MONITORING: ICD-10-CM

## 2019-12-16 LAB — INR PPP: 1.9

## 2019-12-23 ENCOUNTER — ANTI-COAG (OUTPATIENT)
Dept: CARDIOLOGY | Age: 79
End: 2019-12-23

## 2019-12-23 ENCOUNTER — TELEPHONE (OUTPATIENT)
Dept: CARDIOLOGY | Age: 79
End: 2019-12-23

## 2019-12-23 DIAGNOSIS — Z51.81 ENCOUNTER FOR THERAPEUTIC DRUG MONITORING: ICD-10-CM

## 2019-12-23 DIAGNOSIS — Z79.01 LONG TERM (CURRENT) USE OF ANTICOAGULANTS: ICD-10-CM

## 2019-12-23 LAB — INR PPP: 2

## 2019-12-30 ENCOUNTER — TELEPHONE (OUTPATIENT)
Dept: CARDIOLOGY | Age: 79
End: 2019-12-30

## 2019-12-30 ENCOUNTER — ANTI-COAG (OUTPATIENT)
Dept: CARDIOLOGY | Age: 79
End: 2019-12-30

## 2019-12-30 DIAGNOSIS — Z79.01 LONG TERM (CURRENT) USE OF ANTICOAGULANTS: ICD-10-CM

## 2019-12-30 DIAGNOSIS — Z51.81 ENCOUNTER FOR THERAPEUTIC DRUG MONITORING: ICD-10-CM

## 2019-12-30 LAB — INR PPP: 2.8

## 2020-01-01 ENCOUNTER — EXTERNAL RECORD (OUTPATIENT)
Dept: HEALTH INFORMATION MANAGEMENT | Facility: OTHER | Age: 80
End: 2020-01-01

## 2020-01-02 ENCOUNTER — TELEPHONE (OUTPATIENT)
Dept: CARDIOLOGY | Age: 80
End: 2020-01-02

## 2020-01-02 DIAGNOSIS — Z79.01 ANTICOAGULATED ON COUMADIN: Primary | ICD-10-CM

## 2020-01-02 DIAGNOSIS — I48.19 PERSISTENT ATRIAL FIBRILLATION (CMD): ICD-10-CM

## 2020-01-07 ENCOUNTER — ANTI-COAG (OUTPATIENT)
Dept: CARDIOLOGY | Age: 80
End: 2020-01-07

## 2020-01-07 ENCOUNTER — TELEPHONE (OUTPATIENT)
Dept: CARDIOLOGY | Age: 80
End: 2020-01-07

## 2020-01-07 DIAGNOSIS — Z51.81 ENCOUNTER FOR THERAPEUTIC DRUG MONITORING: ICD-10-CM

## 2020-01-07 DIAGNOSIS — Z79.01 LONG TERM (CURRENT) USE OF ANTICOAGULANTS: ICD-10-CM

## 2020-01-07 LAB — INR PPP: 2.1

## 2020-01-21 LAB — INR PPP: 2.6

## 2020-01-22 ENCOUNTER — ANTI-COAG (OUTPATIENT)
Dept: CARDIOLOGY | Age: 80
End: 2020-01-22

## 2020-01-22 DIAGNOSIS — I48.91 ATRIAL FIBRILLATION, UNSPECIFIED TYPE (CMD): ICD-10-CM

## 2020-01-22 DIAGNOSIS — Z51.81 ENCOUNTER FOR THERAPEUTIC DRUG MONITORING: ICD-10-CM

## 2020-01-22 DIAGNOSIS — Z79.01 LONG TERM (CURRENT) USE OF ANTICOAGULANTS: ICD-10-CM

## 2020-02-17 ENCOUNTER — TELEPHONE (OUTPATIENT)
Dept: CARDIOLOGY | Age: 80
End: 2020-02-17

## 2020-02-18 LAB — INR PPP: 2.39 (ref 2–3)

## 2020-02-20 ENCOUNTER — ANTI-COAG (OUTPATIENT)
Dept: CARDIOLOGY | Age: 80
End: 2020-02-20

## 2020-02-20 ENCOUNTER — TELEPHONE (OUTPATIENT)
Dept: CARDIOLOGY | Age: 80
End: 2020-02-20

## 2020-02-20 DIAGNOSIS — Z79.01 LONG TERM (CURRENT) USE OF ANTICOAGULANTS: ICD-10-CM

## 2020-02-20 DIAGNOSIS — Z51.81 ENCOUNTER FOR THERAPEUTIC DRUG MONITORING: ICD-10-CM

## 2020-02-20 DIAGNOSIS — I48.91 ATRIAL FIBRILLATION, UNSPECIFIED TYPE (CMD): ICD-10-CM

## 2020-03-31 PROCEDURE — 93010 ELECTROCARDIOGRAM REPORT: CPT | Performed by: INTERNAL MEDICINE

## 2020-04-01 PROCEDURE — 99222 1ST HOSP IP/OBS MODERATE 55: CPT | Performed by: INTERNAL MEDICINE

## 2020-04-02 PROCEDURE — 99232 SBSQ HOSP IP/OBS MODERATE 35: CPT | Performed by: INTERNAL MEDICINE

## 2020-04-04 PROCEDURE — 99232 SBSQ HOSP IP/OBS MODERATE 35: CPT | Performed by: INTERNAL MEDICINE

## 2020-04-05 PROCEDURE — 99232 SBSQ HOSP IP/OBS MODERATE 35: CPT | Performed by: INTERNAL MEDICINE

## 2020-04-06 PROCEDURE — 99232 SBSQ HOSP IP/OBS MODERATE 35: CPT | Performed by: INTERNAL MEDICINE

## 2020-04-07 PROCEDURE — 99232 SBSQ HOSP IP/OBS MODERATE 35: CPT | Performed by: INTERNAL MEDICINE

## 2020-04-08 PROCEDURE — 99232 SBSQ HOSP IP/OBS MODERATE 35: CPT | Performed by: INTERNAL MEDICINE

## 2020-04-14 ENCOUNTER — TELEPHONE (OUTPATIENT)
Dept: CARDIOLOGY | Age: 80
End: 2020-04-14

## 2020-04-16 ASSESSMENT — ENCOUNTER SYMPTOMS
BRUISES/BLEEDS EASILY: 0
HEMOPTYSIS: 0
COUGH: 0
LIGHT-HEADEDNESS: 0
SUSPICIOUS LESIONS: 0
HEMATOCHEZIA: 0
CHILLS: 0
WEIGHT GAIN: 0
WEIGHT LOSS: 0
FEVER: 0
WEAKNESS: 0
DIZZINESS: 0

## 2020-04-17 ENCOUNTER — TELEPHONE (OUTPATIENT)
Dept: CARDIOLOGY | Age: 80
End: 2020-04-17

## 2020-04-17 ENCOUNTER — OFFICE VISIT (OUTPATIENT)
Dept: CARDIOLOGY | Age: 80
End: 2020-04-17

## 2020-04-17 DIAGNOSIS — I48.91 ATRIAL FIBRILLATION, UNSPECIFIED TYPE (CMD): ICD-10-CM

## 2020-04-17 DIAGNOSIS — E78.2 HYPERLIPIDEMIA, MIXED: ICD-10-CM

## 2020-04-17 DIAGNOSIS — Z95.2 S/P AVR (AORTIC VALVE REPLACEMENT) AND AORTOPLASTY: ICD-10-CM

## 2020-04-17 DIAGNOSIS — I27.20 PULMONARY HTN (CMD): Primary | ICD-10-CM

## 2020-04-17 DIAGNOSIS — I10 ESSENTIAL HYPERTENSION: ICD-10-CM

## 2020-04-17 PROCEDURE — 99441 TELEPHONE E&M BY PHYSICIAN EST PT NOT ORIG PREV 7 DAYS 5-10 MIN: CPT | Performed by: INTERNAL MEDICINE

## 2020-04-21 ENCOUNTER — TELEPHONE (OUTPATIENT)
Dept: CARDIOLOGY | Age: 80
End: 2020-04-21

## 2020-04-23 ENCOUNTER — APPOINTMENT (OUTPATIENT)
Dept: CARDIOLOGY | Age: 80
End: 2020-04-23

## 2020-04-24 ENCOUNTER — OFFICE VISIT (OUTPATIENT)
Dept: CARDIOLOGY | Age: 80
End: 2020-04-24

## 2020-04-24 VITALS
DIASTOLIC BLOOD PRESSURE: 70 MMHG | WEIGHT: 216 LBS | SYSTOLIC BLOOD PRESSURE: 111 MMHG | HEIGHT: 74 IN | HEART RATE: 83 BPM | BODY MASS INDEX: 27.72 KG/M2 | OXYGEN SATURATION: 96 %

## 2020-04-24 DIAGNOSIS — I10 ESSENTIAL HYPERTENSION: ICD-10-CM

## 2020-04-24 DIAGNOSIS — I48.20 CHRONIC ATRIAL FIBRILLATION (CMD): Primary | ICD-10-CM

## 2020-04-24 PROCEDURE — 99443 TELEPHONE E&M BY PHYSICIAN EST PT NOT ORIG PREV 7 DAYS 21-30 MIN: CPT | Performed by: INTERNAL MEDICINE

## 2020-04-24 RX ORDER — BETHANECHOL CHLORIDE 25 MG/1
TABLET ORAL
Refills: 2 | COMMUNITY
Start: 2020-02-06 | End: 2020-04-24 | Stop reason: CLARIF

## 2020-04-24 ASSESSMENT — PATIENT HEALTH QUESTIONNAIRE - PHQ9
2. FEELING DOWN, DEPRESSED OR HOPELESS: NOT AT ALL
1. LITTLE INTEREST OR PLEASURE IN DOING THINGS: NOT AT ALL
SUM OF ALL RESPONSES TO PHQ9 QUESTIONS 1 AND 2: 0
SUM OF ALL RESPONSES TO PHQ9 QUESTIONS 1 AND 2: 0

## 2020-04-30 ENCOUNTER — EXTERNAL RECORD (OUTPATIENT)
Dept: HEALTH INFORMATION MANAGEMENT | Facility: OTHER | Age: 80
End: 2020-04-30

## 2020-04-30 PROBLEM — R19.7 DIARRHEA, UNSPECIFIED TYPE: Status: ACTIVE | Noted: 2020-04-30

## 2020-04-30 PROBLEM — Z87.19 H/O: UPPER GI BLEED: Status: ACTIVE | Noted: 2020-04-30

## 2020-05-13 ENCOUNTER — TELEPHONE (OUTPATIENT)
Dept: CARDIOLOGY | Age: 80
End: 2020-05-13

## 2020-05-14 ENCOUNTER — OFFICE VISIT (OUTPATIENT)
Dept: CARDIOLOGY | Age: 80
End: 2020-05-14

## 2020-05-14 DIAGNOSIS — Z95.2 S/P AVR (AORTIC VALVE REPLACEMENT) AND AORTOPLASTY: ICD-10-CM

## 2020-05-14 DIAGNOSIS — E78.2 HYPERLIPIDEMIA, MIXED: Primary | ICD-10-CM

## 2020-05-14 DIAGNOSIS — I48.20 CHRONIC ATRIAL FIBRILLATION (CMD): ICD-10-CM

## 2020-05-14 DIAGNOSIS — I10 ESSENTIAL HYPERTENSION: ICD-10-CM

## 2020-05-14 PROCEDURE — 99441 TELEPHONE E&M BY PHYSICIAN EST PT NOT ORIG PREV 7 DAYS 5-10 MIN: CPT | Performed by: INTERNAL MEDICINE

## 2020-05-14 RX ORDER — METOPROLOL TARTRATE 50 MG/1
TABLET, FILM COATED ORAL
Qty: 180 TABLET | Refills: 3 | Status: SHIPPED | OUTPATIENT
Start: 2020-05-14 | End: 2020-05-14 | Stop reason: SDUPTHER

## 2020-05-14 RX ORDER — METOPROLOL TARTRATE 50 MG/1
50 TABLET, FILM COATED ORAL 2 TIMES DAILY
Qty: 180 TABLET | Refills: 3 | Status: SHIPPED | OUTPATIENT
Start: 2020-05-14 | End: 2021-09-15

## 2020-05-14 RX ORDER — DIGOXIN 250 MCG
250 TABLET ORAL DAILY
Qty: 90 TABLET | Refills: 1 | Status: SHIPPED | OUTPATIENT
Start: 2020-05-14 | End: 2020-11-10 | Stop reason: SDUPTHER

## 2020-05-14 ASSESSMENT — ENCOUNTER SYMPTOMS
CHILLS: 0
SUSPICIOUS LESIONS: 0
HEMOPTYSIS: 0
BRUISES/BLEEDS EASILY: 0
FEVER: 0
HEMATOCHEZIA: 0
WEIGHT GAIN: 0
COUGH: 0
SHORTNESS OF BREATH: 1
WEIGHT LOSS: 0

## 2020-06-04 ENCOUNTER — APPOINTMENT (OUTPATIENT)
Dept: CARDIOLOGY | Age: 80
End: 2020-06-04

## 2020-06-12 ENCOUNTER — TELEPHONE (OUTPATIENT)
Dept: INTERVENTIONAL RADIOLOGY/VASCULAR | Facility: HOSPITAL | Age: 80
End: 2020-06-12

## 2020-06-30 ENCOUNTER — TELEPHONE (OUTPATIENT)
Dept: CARDIOLOGY CLINIC | Facility: HOSPITAL | Age: 80
End: 2020-06-30

## 2020-06-30 NOTE — TELEPHONE ENCOUNTER
Called patient again to discuss Watchman. No anser. DIMA. He will need a consult with one of the [facility] physicians.

## 2020-07-03 ENCOUNTER — TELEPHONE (OUTPATIENT)
Dept: INTERVENTIONAL RADIOLOGY/VASCULAR | Facility: HOSPITAL | Age: 80
End: 2020-07-03

## 2020-07-03 NOTE — TELEPHONE ENCOUNTER
LM for patient to schedule Watchman procedure. This is the third time I have left a message, there is only one number on file to call.

## 2020-07-17 ENCOUNTER — TELEPHONE (OUTPATIENT)
Dept: INTERVENTIONAL RADIOLOGY/VASCULAR | Facility: HOSPITAL | Age: 80
End: 2020-07-17

## 2020-07-17 NOTE — TELEPHONE ENCOUNTER
Pt returned call. He would like to have Watchman procedure on 8/11/20. I explained that he needs to call [facility] to schedule his CT scan, blood work, EKG and CXR. He v/u.   I explained that he will get a call from PAT RN to set up his pre proc

## 2020-07-27 RX ORDER — DILTIAZEM HYDROCHLORIDE 5 MG/ML
5 INJECTION INTRAVENOUS SEE ADMIN INSTRUCTIONS
Status: DISCONTINUED | OUTPATIENT
Start: 2020-07-28 | End: 2020-07-30

## 2020-07-27 RX ORDER — METOPROLOL TARTRATE 5 MG/5ML
5 INJECTION INTRAVENOUS SEE ADMIN INSTRUCTIONS
Status: DISCONTINUED | OUTPATIENT
Start: 2020-07-28 | End: 2020-07-30

## 2020-07-27 RX ORDER — NITROGLYCERIN 0.4 MG/1
0.4 TABLET SUBLINGUAL ONCE
Status: COMPLETED | OUTPATIENT
Start: 2020-07-28 | End: 2020-07-28

## 2020-07-27 NOTE — IMAGING NOTE
Spoke with patient's wife regarding arrival time and location, no caffeine or exercise, and medications.

## 2020-07-28 ENCOUNTER — HOSPITAL ENCOUNTER (OUTPATIENT)
Dept: CT IMAGING | Facility: HOSPITAL | Age: 80
Discharge: HOME | End: 2020-07-28
Attending: INTERNAL MEDICINE
Payer: MEDICARE

## 2020-07-28 ENCOUNTER — HOSPITAL ENCOUNTER (OUTPATIENT)
Dept: GENERAL RADIOLOGY | Facility: HOSPITAL | Age: 80
Discharge: HOME | End: 2020-07-28
Attending: INTERNAL MEDICINE
Payer: MEDICARE

## 2020-07-28 VITALS
WEIGHT: 217 LBS | HEIGHT: 74 IN | DIASTOLIC BLOOD PRESSURE: 73 MMHG | SYSTOLIC BLOOD PRESSURE: 118 MMHG | BODY MASS INDEX: 27.85 KG/M2 | RESPIRATION RATE: 17 BRPM | HEART RATE: 64 BPM

## 2020-07-28 DIAGNOSIS — I48.91 ATRIAL FIBRILLATION (HCC): ICD-10-CM

## 2020-07-28 LAB
ANION GAP SERPL CALC-SCNC: 3 MMOL/L (ref 0–18)
BASOPHILS # BLD AUTO: 0.02 X10(3) UL (ref 0–0.2)
BASOPHILS NFR BLD AUTO: 0.5 %
BUN BLD-MCNC: 26 MG/DL (ref 7–18)
BUN/CREAT SERPL: 31.7 (ref 10–20)
CALCIUM BLD-MCNC: 8.7 MG/DL (ref 8.5–10.1)
CHLORIDE SERPL-SCNC: 101 MMOL/L (ref 98–112)
CO2 SERPL-SCNC: 35 MMOL/L (ref 21–32)
CREAT BLD-MCNC: 0.7 MG/DL (ref 0.7–1.3)
CREAT BLD-MCNC: 0.82 MG/DL (ref 0.7–1.3)
DEPRECATED RDW RBC AUTO: 72.6 FL (ref 35.1–46.3)
EOSINOPHIL # BLD AUTO: 0.09 X10(3) UL (ref 0–0.7)
EOSINOPHIL NFR BLD AUTO: 2.2 %
ERYTHROCYTE [DISTWIDTH] IN BLOOD BY AUTOMATED COUNT: 21.7 % (ref 11–15)
GLUCOSE BLD-MCNC: 101 MG/DL (ref 70–99)
HCT VFR BLD AUTO: 39.9 % (ref 39–53)
HGB BLD-MCNC: 12.5 G/DL (ref 13–17.5)
IMM GRANULOCYTES # BLD AUTO: 0.01 X10(3) UL (ref 0–1)
IMM GRANULOCYTES NFR BLD: 0.2 %
LYMPHOCYTES # BLD AUTO: 0.95 X10(3) UL (ref 1–4)
LYMPHOCYTES NFR BLD AUTO: 22.8 %
MCH RBC QN AUTO: 27.8 PG (ref 26–34)
MCHC RBC AUTO-ENTMCNC: 31.3 G/DL (ref 31–37)
MCV RBC AUTO: 88.7 FL (ref 80–100)
MONOCYTES # BLD AUTO: 0.61 X10(3) UL (ref 0.1–1)
MONOCYTES NFR BLD AUTO: 14.6 %
NEUTROPHILS # BLD AUTO: 2.49 X10 (3) UL (ref 1.5–7.7)
NEUTROPHILS # BLD AUTO: 2.49 X10(3) UL (ref 1.5–7.7)
NEUTROPHILS NFR BLD AUTO: 59.7 %
OSMOLALITY SERPL CALC.SUM OF ELEC: 293 MOSM/KG (ref 275–295)
PATIENT FASTING Y/N/NP: YES
PLATELET # BLD AUTO: 82 10(3)UL (ref 150–450)
PLATELET MORPHOLOGY: NORMAL
POTASSIUM SERPL-SCNC: 4.1 MMOL/L (ref 3.5–5.1)
RBC # BLD AUTO: 4.5 X10(6)UL (ref 3.8–5.8)
RH BLOOD TYPE: POSITIVE
SODIUM SERPL-SCNC: 139 MMOL/L (ref 136–145)
WBC # BLD AUTO: 4.2 X10(3) UL (ref 4–11)

## 2020-07-28 PROCEDURE — 36415 COLL VENOUS BLD VENIPUNCTURE: CPT | Performed by: INTERNAL MEDICINE

## 2020-07-28 PROCEDURE — 93010 ELECTROCARDIOGRAM REPORT: CPT | Performed by: INTERNAL MEDICINE

## 2020-07-28 PROCEDURE — 86850 RBC ANTIBODY SCREEN: CPT | Performed by: INTERNAL MEDICINE

## 2020-07-28 PROCEDURE — 86901 BLOOD TYPING SEROLOGIC RH(D): CPT | Performed by: INTERNAL MEDICINE

## 2020-07-28 PROCEDURE — 82565 ASSAY OF CREATININE: CPT

## 2020-07-28 PROCEDURE — 86900 BLOOD TYPING SEROLOGIC ABO: CPT | Performed by: INTERNAL MEDICINE

## 2020-07-28 PROCEDURE — 75574 CT ANGIO HRT W/3D IMAGE: CPT | Performed by: INTERNAL MEDICINE

## 2020-07-28 PROCEDURE — 71046 X-RAY EXAM CHEST 2 VIEWS: CPT | Performed by: INTERNAL MEDICINE

## 2020-07-28 PROCEDURE — 36591 DRAW BLOOD OFF VENOUS DEVICE: CPT

## 2020-07-28 PROCEDURE — 80048 BASIC METABOLIC PNL TOTAL CA: CPT | Performed by: INTERNAL MEDICINE

## 2020-07-28 PROCEDURE — 85025 COMPLETE CBC W/AUTO DIFF WBC: CPT | Performed by: INTERNAL MEDICINE

## 2020-07-28 PROCEDURE — 96360 HYDRATION IV INFUSION INIT: CPT

## 2020-07-28 PROCEDURE — 93005 ELECTROCARDIOGRAM TRACING: CPT

## 2020-07-28 RX ORDER — METOPROLOL TARTRATE 5 MG/5ML
INJECTION INTRAVENOUS
Status: COMPLETED
Start: 2020-07-28 | End: 2020-07-28

## 2020-07-28 RX ORDER — DEXTROSE AND SODIUM CHLORIDE 5; .45 G/100ML; G/100ML
INJECTION, SOLUTION INTRAVENOUS ONCE
Status: COMPLETED | OUTPATIENT
Start: 2020-07-28 | End: 2020-07-28

## 2020-07-28 RX ADMIN — METOPROLOL TARTRATE 5 MG: 5 INJECTION INTRAVENOUS at 10:17:00

## 2020-07-28 RX ADMIN — NITROGLYCERIN 0.4 MG: 0.4 TABLET SUBLINGUAL at 10:35:00

## 2020-07-28 RX ADMIN — METOPROLOL TARTRATE 5 MG: 5 INJECTION INTRAVENOUS at 10:30:00

## 2020-07-28 RX ADMIN — DEXTROSE AND SODIUM CHLORIDE 500 ML/HR: 5; .45 INJECTION, SOLUTION INTRAVENOUS at 11:02:00

## 2020-07-28 NOTE — IMAGING NOTE
0940 TO RAD HOLDING       HX TAKEN :Associated DX:  Atrial fibrillation  Pt states has a cardiac stent     0948 PT CONSENTED      BASELINE VITAL SIGNS   HR 57-66 /69    CTA ORDERED BY DR Caicedo Corpus    WAS PT GIVEN CTA  PREMEDS - PT TAKE METOPROLOL 50 MG B

## 2020-07-29 LAB — ANTIBODY SCREEN: NEGATIVE

## 2020-07-31 ENCOUNTER — TELEPHONE (OUTPATIENT)
Dept: INTERVENTIONAL RADIOLOGY/VASCULAR | Facility: HOSPITAL | Age: 80
End: 2020-07-31

## 2020-07-31 NOTE — TELEPHONE ENCOUNTER
Patient had a pre Watchman CTA on 7/28/20. Dr Taylor Harper unable to see TIFFANI on CT. OP report from 10/2008 AVR obtained from [facility]. It was noted in the report that the patients TIFFANI was excised at the time of his AVR.   Per Dr. Taylor Harper, there

## 2020-08-11 ENCOUNTER — HOSPITAL ENCOUNTER (INPATIENT)
Dept: INTERVENTIONAL RADIOLOGY/VASCULAR | Facility: HOSPITAL | Age: 80
Discharge: HOME | End: 2020-08-11
Attending: INTERNAL MEDICINE
Payer: MEDICARE

## 2020-08-11 DIAGNOSIS — I48.91 A-FIB (HCC): Primary | ICD-10-CM

## 2020-08-15 PROBLEM — N40.1 BENIGN PROSTATIC HYPERPLASIA WITH WEAK URINARY STREAM: Status: ACTIVE | Noted: 2020-08-15

## 2020-08-15 PROBLEM — R39.12 BENIGN PROSTATIC HYPERPLASIA WITH WEAK URINARY STREAM: Status: ACTIVE | Noted: 2020-08-15

## 2020-08-27 ENCOUNTER — TELEPHONE (OUTPATIENT)
Dept: CARDIOLOGY | Age: 80
End: 2020-08-27

## 2020-09-18 ENCOUNTER — OFFICE VISIT (OUTPATIENT)
Dept: CARDIOLOGY | Age: 80
End: 2020-09-18

## 2020-09-18 VITALS
HEART RATE: 66 BPM | BODY MASS INDEX: 29.16 KG/M2 | WEIGHT: 220 LBS | DIASTOLIC BLOOD PRESSURE: 60 MMHG | SYSTOLIC BLOOD PRESSURE: 112 MMHG | HEIGHT: 73 IN

## 2020-09-18 DIAGNOSIS — Z95.2 S/P AVR (AORTIC VALVE REPLACEMENT) AND AORTOPLASTY: ICD-10-CM

## 2020-09-18 DIAGNOSIS — I27.20 PULMONARY HTN (CMD): Primary | ICD-10-CM

## 2020-09-18 DIAGNOSIS — I48.20 CHRONIC ATRIAL FIBRILLATION (CMD): ICD-10-CM

## 2020-09-18 DIAGNOSIS — I10 ESSENTIAL HYPERTENSION: ICD-10-CM

## 2020-09-18 DIAGNOSIS — E78.2 HYPERLIPIDEMIA, MIXED: ICD-10-CM

## 2020-09-18 PROCEDURE — 99214 OFFICE O/P EST MOD 30 MIN: CPT | Performed by: INTERNAL MEDICINE

## 2020-09-18 RX ORDER — LANOLIN ALCOHOL/MO/W.PET/CERES
325 CREAM (GRAM) TOPICAL
COMMUNITY

## 2020-09-18 ASSESSMENT — ENCOUNTER SYMPTOMS
COUGH: 0
CHILLS: 0
BRUISES/BLEEDS EASILY: 0
FEVER: 0
SUSPICIOUS LESIONS: 0
WEIGHT GAIN: 0
WEIGHT LOSS: 0
HEMATOCHEZIA: 0
HEMOPTYSIS: 0

## 2020-09-24 ENCOUNTER — APPOINTMENT (OUTPATIENT)
Dept: CARDIOLOGY | Age: 80
End: 2020-09-24

## 2020-11-10 PROBLEM — I50.33 ACUTE ON CHRONIC DIASTOLIC CONGESTIVE HEART FAILURE (HCC): Status: RESOLVED | Noted: 2018-06-19 | Resolved: 2020-11-10

## 2020-11-10 PROBLEM — I50.32 CHRONIC DIASTOLIC CHF (CONGESTIVE HEART FAILURE) (HCC): Status: ACTIVE | Noted: 2020-11-10

## 2020-11-10 RX ORDER — DIGOXIN 250 MCG
250 TABLET ORAL DAILY
Qty: 90 TABLET | Refills: 1 | Status: SHIPPED | OUTPATIENT
Start: 2020-11-10 | End: 2021-05-10 | Stop reason: SDUPTHER

## 2021-01-01 ENCOUNTER — EXTERNAL RECORD (OUTPATIENT)
Dept: OTHER | Age: 81
End: 2021-01-01

## 2021-01-01 ENCOUNTER — EXTERNAL RECORD (OUTPATIENT)
Dept: HEALTH INFORMATION MANAGEMENT | Facility: OTHER | Age: 81
End: 2021-01-01

## 2021-01-04 PROCEDURE — 93010 ELECTROCARDIOGRAM REPORT: CPT | Performed by: INTERNAL MEDICINE

## 2021-01-05 PROCEDURE — 99222 1ST HOSP IP/OBS MODERATE 55: CPT | Performed by: INTERNAL MEDICINE

## 2021-01-06 PROCEDURE — 99232 SBSQ HOSP IP/OBS MODERATE 35: CPT | Performed by: INTERNAL MEDICINE

## 2021-01-06 PROCEDURE — 93306 TTE W/DOPPLER COMPLETE: CPT | Performed by: INTERNAL MEDICINE

## 2021-01-07 PROCEDURE — 99232 SBSQ HOSP IP/OBS MODERATE 35: CPT | Performed by: INTERNAL MEDICINE

## 2021-01-08 PROCEDURE — 99232 SBSQ HOSP IP/OBS MODERATE 35: CPT | Performed by: INTERNAL MEDICINE

## 2021-01-14 ENCOUNTER — OFFICE VISIT (OUTPATIENT)
Dept: CARDIOLOGY | Age: 81
End: 2021-01-14

## 2021-01-14 VITALS
BODY MASS INDEX: 28.76 KG/M2 | SYSTOLIC BLOOD PRESSURE: 102 MMHG | HEART RATE: 83 BPM | WEIGHT: 218 LBS | DIASTOLIC BLOOD PRESSURE: 54 MMHG | OXYGEN SATURATION: 98 % | TEMPERATURE: 97.2 F

## 2021-01-14 DIAGNOSIS — I48.20 CHRONIC ATRIAL FIBRILLATION (CMD): ICD-10-CM

## 2021-01-14 DIAGNOSIS — I27.20 PULMONARY HTN (CMD): ICD-10-CM

## 2021-01-14 DIAGNOSIS — I10 ESSENTIAL HYPERTENSION: ICD-10-CM

## 2021-01-14 DIAGNOSIS — E78.2 HYPERLIPIDEMIA, MIXED: ICD-10-CM

## 2021-01-14 DIAGNOSIS — I50.82 BIVENTRICULAR CONGESTIVE HEART FAILURE (CMD): Primary | ICD-10-CM

## 2021-01-14 DIAGNOSIS — Z95.2 S/P AVR (AORTIC VALVE REPLACEMENT) AND AORTOPLASTY: ICD-10-CM

## 2021-01-14 PROCEDURE — 99214 OFFICE O/P EST MOD 30 MIN: CPT | Performed by: INTERNAL MEDICINE

## 2021-01-14 RX ORDER — ACETAZOLAMIDE 250 MG/1
250 TABLET ORAL 2 TIMES DAILY
COMMUNITY
End: 2021-02-01 | Stop reason: SDUPTHER

## 2021-01-14 RX ORDER — FOLIC ACID 1 MG/1
1 TABLET ORAL DAILY
COMMUNITY
End: 2021-05-13 | Stop reason: SDUPTHER

## 2021-01-14 RX ORDER — AMPICILLIN TRIHYDRATE 250 MG
200 CAPSULE ORAL DAILY
COMMUNITY

## 2021-01-14 RX ORDER — CHOLECALCIFEROL (VITAMIN D3) 125 MCG
1500 CAPSULE ORAL DAILY
COMMUNITY

## 2021-01-14 RX ORDER — OMEGA-3 FATTY ACIDS/FISH OIL 300-1000MG
1000 CAPSULE ORAL DAILY
COMMUNITY

## 2021-01-14 RX ORDER — BUMETANIDE 1 MG/1
2 TABLET ORAL 2 TIMES DAILY
COMMUNITY
End: 2021-02-01 | Stop reason: SDUPTHER

## 2021-01-14 RX ORDER — CHOLECALCIFEROL (VITAMIN D3) 125 MCG
1 CAPSULE ORAL DAILY
COMMUNITY

## 2021-01-14 RX ORDER — LEVOTHYROXINE SODIUM 0.07 MG/1
75 TABLET ORAL DAILY
COMMUNITY

## 2021-01-14 RX ORDER — ALFUZOSIN HYDROCHLORIDE 10 MG/1
10 TABLET, EXTENDED RELEASE ORAL DAILY
COMMUNITY

## 2021-01-14 SDOH — HEALTH STABILITY: MENTAL HEALTH: HOW OFTEN DO YOU HAVE A DRINK CONTAINING ALCOHOL?: NEVER

## 2021-01-14 ASSESSMENT — PATIENT HEALTH QUESTIONNAIRE - PHQ9
CLINICAL INTERPRETATION OF PHQ2 SCORE: NO FURTHER SCREENING NEEDED
SUM OF ALL RESPONSES TO PHQ9 QUESTIONS 1 AND 2: 0
SUM OF ALL RESPONSES TO PHQ9 QUESTIONS 1 AND 2: 0
CLINICAL INTERPRETATION OF PHQ9 SCORE: NO FURTHER SCREENING NEEDED
2. FEELING DOWN, DEPRESSED OR HOPELESS: NOT AT ALL
1. LITTLE INTEREST OR PLEASURE IN DOING THINGS: NOT AT ALL

## 2021-01-14 ASSESSMENT — ENCOUNTER SYMPTOMS
LIGHT-HEADEDNESS: 0
DIZZINESS: 0
BRUISES/BLEEDS EASILY: 0
HEMOPTYSIS: 0
DOUBLE VISION: 0
CHILLS: 0
WEIGHT GAIN: 0
BLURRED VISION: 0
SHORTNESS OF BREATH: 0
FEVER: 0
COUGH: 0
SUSPICIOUS LESIONS: 0
WEIGHT LOSS: 0
HEADACHES: 0

## 2021-01-26 ENCOUNTER — EXTERNAL RECORD (OUTPATIENT)
Dept: HEALTH INFORMATION MANAGEMENT | Facility: OTHER | Age: 81
End: 2021-01-26

## 2021-02-01 RX ORDER — ACETAZOLAMIDE 250 MG/1
250 TABLET ORAL 2 TIMES DAILY
Qty: 180 TABLET | Refills: 3 | Status: SHIPPED | OUTPATIENT
Start: 2021-02-01 | End: 2021-05-11 | Stop reason: ALTCHOICE

## 2021-02-01 RX ORDER — BUMETANIDE 1 MG/1
2 TABLET ORAL 2 TIMES DAILY
Qty: 360 TABLET | Refills: 3 | Status: SHIPPED | OUTPATIENT
Start: 2021-02-01 | End: 2021-05-11 | Stop reason: ALTCHOICE

## 2021-02-16 DIAGNOSIS — I50.82 BIVENTRICULAR CONGESTIVE HEART FAILURE (CMD): ICD-10-CM

## 2021-02-18 ENCOUNTER — OFFICE VISIT (OUTPATIENT)
Dept: CARDIOLOGY | Age: 81
End: 2021-02-18

## 2021-02-18 VITALS
OXYGEN SATURATION: 95 % | WEIGHT: 215.4 LBS | SYSTOLIC BLOOD PRESSURE: 100 MMHG | DIASTOLIC BLOOD PRESSURE: 52 MMHG | HEART RATE: 60 BPM | TEMPERATURE: 97.6 F | BODY MASS INDEX: 28.42 KG/M2

## 2021-02-18 DIAGNOSIS — E78.2 HYPERLIPIDEMIA, MIXED: ICD-10-CM

## 2021-02-18 DIAGNOSIS — I48.20 CHRONIC ATRIAL FIBRILLATION (CMD): ICD-10-CM

## 2021-02-18 DIAGNOSIS — Z95.2 S/P AVR (AORTIC VALVE REPLACEMENT) AND AORTOPLASTY: ICD-10-CM

## 2021-02-18 DIAGNOSIS — I10 ESSENTIAL HYPERTENSION: ICD-10-CM

## 2021-02-18 DIAGNOSIS — I27.20 PULMONARY HTN (CMD): Primary | ICD-10-CM

## 2021-02-18 PROCEDURE — 99214 OFFICE O/P EST MOD 30 MIN: CPT | Performed by: INTERNAL MEDICINE

## 2021-02-18 SDOH — HEALTH STABILITY: MENTAL HEALTH: HOW OFTEN DO YOU HAVE A DRINK CONTAINING ALCOHOL?: NEVER

## 2021-02-18 ASSESSMENT — ENCOUNTER SYMPTOMS
HEADACHES: 0
WEIGHT LOSS: 0
HEMOPTYSIS: 0
SUSPICIOUS LESIONS: 0
WEIGHT GAIN: 1
CHILLS: 0
BRUISES/BLEEDS EASILY: 0
FEVER: 0
SHORTNESS OF BREATH: 0
BLURRED VISION: 0
COUGH: 0
LIGHT-HEADEDNESS: 0
DIZZINESS: 0
DOUBLE VISION: 0

## 2021-02-18 ASSESSMENT — PATIENT HEALTH QUESTIONNAIRE - PHQ9
CLINICAL INTERPRETATION OF PHQ2 SCORE: NO FURTHER SCREENING NEEDED
2. FEELING DOWN, DEPRESSED OR HOPELESS: NOT AT ALL
1. LITTLE INTEREST OR PLEASURE IN DOING THINGS: NOT AT ALL
SUM OF ALL RESPONSES TO PHQ9 QUESTIONS 1 AND 2: 0
SUM OF ALL RESPONSES TO PHQ9 QUESTIONS 1 AND 2: 0
CLINICAL INTERPRETATION OF PHQ9 SCORE: NO FURTHER SCREENING NEEDED

## 2021-04-07 PROCEDURE — 93010 ELECTROCARDIOGRAM REPORT: CPT | Performed by: INTERNAL MEDICINE

## 2021-04-08 PROCEDURE — 99222 1ST HOSP IP/OBS MODERATE 55: CPT | Performed by: INTERNAL MEDICINE

## 2021-04-09 PROCEDURE — 99232 SBSQ HOSP IP/OBS MODERATE 35: CPT | Performed by: INTERNAL MEDICINE

## 2021-04-10 PROCEDURE — 99232 SBSQ HOSP IP/OBS MODERATE 35: CPT | Performed by: INTERNAL MEDICINE

## 2021-04-11 PROCEDURE — 99232 SBSQ HOSP IP/OBS MODERATE 35: CPT | Performed by: INTERNAL MEDICINE

## 2021-04-12 PROCEDURE — 99232 SBSQ HOSP IP/OBS MODERATE 35: CPT | Performed by: INTERNAL MEDICINE

## 2021-04-13 PROCEDURE — 99232 SBSQ HOSP IP/OBS MODERATE 35: CPT | Performed by: NURSE PRACTITIONER

## 2021-04-14 PROCEDURE — 99232 SBSQ HOSP IP/OBS MODERATE 35: CPT | Performed by: INTERNAL MEDICINE

## 2021-04-15 PROCEDURE — 99232 SBSQ HOSP IP/OBS MODERATE 35: CPT | Performed by: INTERNAL MEDICINE

## 2021-04-16 PROCEDURE — 99232 SBSQ HOSP IP/OBS MODERATE 35: CPT | Performed by: INTERNAL MEDICINE

## 2021-04-17 PROCEDURE — 99232 SBSQ HOSP IP/OBS MODERATE 35: CPT | Performed by: INTERNAL MEDICINE

## 2021-04-18 PROCEDURE — 99232 SBSQ HOSP IP/OBS MODERATE 35: CPT | Performed by: INTERNAL MEDICINE

## 2021-04-19 PROCEDURE — 99232 SBSQ HOSP IP/OBS MODERATE 35: CPT | Performed by: INTERNAL MEDICINE

## 2021-04-20 ENCOUNTER — TELEPHONE (OUTPATIENT)
Dept: CARDIOLOGY | Age: 81
End: 2021-04-20

## 2021-05-06 ENCOUNTER — TELEPHONE (OUTPATIENT)
Dept: CARDIOLOGY | Age: 81
End: 2021-05-06

## 2021-05-10 RX ORDER — DIGOXIN 250 MCG
250 TABLET ORAL DAILY
Qty: 90 TABLET | Refills: 1 | Status: SHIPPED | OUTPATIENT
Start: 2021-05-10 | End: 2021-05-13 | Stop reason: DRUGHIGH

## 2021-05-11 LAB
ABSOLUTE IMMATURE GRANULOCYTES (OFFPRE24): 0 X10'3/MICROL (ref 0–0.1)
ANION GAP SERPL CALC-SCNC: 11 MMOL/L (ref 8–16)
BASOPHILS # BLD: 0 X10'3/MICROL (ref 0–0.2)
BASOPHILS NFR BLD: 0.5 %
BUN SERPL-MCNC: 21 MG/DL (ref 7–25)
BUN/CREAT SERPL: 30 RATIO (ref 7–23)
CALCIUM SERPL-MCNC: 9.8 MG/DL (ref 8.6–10.4)
CHLORIDE SERPL-SCNC: 90 MEQ/L (ref 98–107)
CO2 SERPL-SCNC: 38 MEQ/L (ref 21–31)
CREAT SERPL-MCNC: 0.69 MG/DL (ref 0.7–1.3)
EOSINOPHIL # BLD: 0.1 X10'3/MICROL (ref 0–0.7)
EOSINOPHIL NFR BLD: 2.3 %
ERYTHROCYTE [DISTWIDTH] IN BLOOD: 13.1 % (ref 11–15.9)
GLUCOSE SERPL-MCNC: 89 MG/DL (ref 70–99)
HCT VFR BLD CALC: 35.4 % (ref 34.1–50.9)
HGB BLD-MCNC: 11.6 G/DL (ref 11.6–17.6)
IMMATURE GRANULOCYTES (OFFPRE25): 0.5 %
LENGTH OF FAST TIME PATIENT: ABNORMAL H
LYMPHOCYTES # BLD: 0.7 X10'3/MICROL (ref 0.1–4.4)
LYMPHOCYTES NFR BLD: 18.6 %
MAGNESIUM SERPL-MCNC: 1.8 MG/DL (ref 1.6–2.6)
MCH RBC QN AUTO: 31.4 PG
MCHC RBC AUTO-ENTMCNC: 32.8 G/DL (ref 33–36.1)
MCV RBC AUTO: 95.7 FL (ref 80.5–96)
MONOCYTES # BLD: 0.6 X10'3/MICROL (ref 0.1–1.3)
MONOCYTES NFR BLD: 15.2 %
MPV (OFFPRE2): 12.9 FL (ref 9.3–12)
NEUTROPHILS # BLD: 2.4 X10'3/MICROL (ref 1.8–9)
NEUTROPHILS NFR BLD: 62.9 %
PLAT MORPH BLD: ABNORMAL
PLATELET # BLD: 97 X10'3/MICROL (ref 125–400)
POTASSIUM SERPL-SCNC: 3 MMOL/L (ref 3.5–5.1)
RBC # BLD: 3.7 X10'6/MICROL (ref 4.2–6.1)
SODIUM SERPL-SCNC: 139 MEQ/L (ref 133–144)
WBC # BLD: 3.9 X10'3/MICROL (ref 4.5–11)

## 2021-05-11 RX ORDER — FUROSEMIDE 40 MG/1
80 TABLET ORAL 2 TIMES DAILY
Status: SHIPPED | COMMUNITY
Start: 2021-05-11 | End: 2021-05-13 | Stop reason: SDUPTHER

## 2021-05-11 RX ORDER — METOLAZONE 2.5 MG/1
2.5 TABLET ORAL DAILY
Status: SHIPPED | COMMUNITY
Start: 2021-05-11 | End: 2021-05-13 | Stop reason: SDUPTHER

## 2021-05-11 RX ORDER — SPIRONOLACTONE 25 MG/1
25 TABLET ORAL DAILY
Status: SHIPPED | COMMUNITY
Start: 2021-05-11 | End: 2021-05-13 | Stop reason: SDUPTHER

## 2021-05-11 RX ORDER — POTASSIUM CHLORIDE 20 MEQ/1
20 TABLET, EXTENDED RELEASE ORAL 3 TIMES DAILY
Status: SHIPPED | COMMUNITY
Start: 2021-05-11 | End: 2021-05-13 | Stop reason: SDUPTHER

## 2021-05-13 ENCOUNTER — OFFICE VISIT (OUTPATIENT)
Dept: CARDIOLOGY | Age: 81
End: 2021-05-13

## 2021-05-13 VITALS
HEART RATE: 60 BPM | BODY MASS INDEX: 25.26 KG/M2 | HEIGHT: 73 IN | WEIGHT: 190.6 LBS | SYSTOLIC BLOOD PRESSURE: 92 MMHG | OXYGEN SATURATION: 95 % | DIASTOLIC BLOOD PRESSURE: 56 MMHG

## 2021-05-13 DIAGNOSIS — I48.20 CHRONIC ATRIAL FIBRILLATION (CMD): ICD-10-CM

## 2021-05-13 DIAGNOSIS — E78.2 HYPERLIPIDEMIA, MIXED: ICD-10-CM

## 2021-05-13 DIAGNOSIS — I10 ESSENTIAL HYPERTENSION: ICD-10-CM

## 2021-05-13 DIAGNOSIS — Z95.2 S/P AVR (AORTIC VALVE REPLACEMENT) AND AORTOPLASTY: ICD-10-CM

## 2021-05-13 DIAGNOSIS — I27.20 PULMONARY HTN (CMD): Primary | ICD-10-CM

## 2021-05-13 DIAGNOSIS — Z51.81 ENCOUNTER FOR THERAPEUTIC DRUG MONITORING: ICD-10-CM

## 2021-05-13 PROCEDURE — 99214 OFFICE O/P EST MOD 30 MIN: CPT | Performed by: INTERNAL MEDICINE

## 2021-05-13 RX ORDER — POTASSIUM CHLORIDE 20 MEQ/1
40 TABLET, EXTENDED RELEASE ORAL 3 TIMES DAILY
Qty: 90 TABLET | Refills: 3 | Status: SHIPPED | OUTPATIENT
Start: 2021-05-13 | End: 2021-05-21

## 2021-05-13 RX ORDER — METOLAZONE 2.5 MG/1
2.5 TABLET ORAL DAILY
Qty: 90 TABLET | Refills: 3 | Status: SHIPPED | OUTPATIENT
Start: 2021-05-13 | End: 2021-05-21 | Stop reason: SDUPTHER

## 2021-05-13 RX ORDER — SPIRONOLACTONE 25 MG/1
25 TABLET ORAL DAILY
Qty: 90 TABLET | Refills: 3 | Status: SHIPPED | OUTPATIENT
Start: 2021-05-13 | End: 2021-11-29 | Stop reason: DRUGHIGH

## 2021-05-13 RX ORDER — FOLIC ACID 1 MG/1
1 TABLET ORAL DAILY
Qty: 90 TABLET | Refills: 3 | Status: SHIPPED | OUTPATIENT
Start: 2021-05-13 | End: 2022-04-29 | Stop reason: SDUPTHER

## 2021-05-13 RX ORDER — FUROSEMIDE 80 MG
80 TABLET ORAL 2 TIMES DAILY
Qty: 180 TABLET | Refills: 3 | Status: SHIPPED | OUTPATIENT
Start: 2021-05-13 | End: 2022-04-29 | Stop reason: SDUPTHER

## 2021-05-13 RX ORDER — DIGOXIN 250 MCG
125 TABLET ORAL DAILY
Status: CANCELLED | OUTPATIENT
Start: 2021-05-13

## 2021-05-13 RX ORDER — DIGOXIN 125 MCG
125 TABLET ORAL DAILY
Qty: 90 TABLET | Refills: 3 | Status: SHIPPED | OUTPATIENT
Start: 2021-05-13 | End: 2022-04-29 | Stop reason: SDUPTHER

## 2021-05-13 ASSESSMENT — ENCOUNTER SYMPTOMS
HEMATOCHEZIA: 0
WEAKNESS: 0
SYNCOPE: 0
LIGHT-HEADEDNESS: 0
HEMOPTYSIS: 0
WEIGHT LOSS: 0
WEIGHT GAIN: 0
SUSPICIOUS LESIONS: 0
SHORTNESS OF BREATH: 0
FEVER: 0
HEADACHES: 0
BRUISES/BLEEDS EASILY: 0
CHILLS: 0
FOCAL WEAKNESS: 0
ORTHOPNEA: 0
DIZZINESS: 0
NEAR-SYNCOPE: 0
COUGH: 0

## 2021-05-13 ASSESSMENT — PATIENT HEALTH QUESTIONNAIRE - PHQ9
CLINICAL INTERPRETATION OF PHQ2 SCORE: NO FURTHER SCREENING NEEDED
CLINICAL INTERPRETATION OF PHQ9 SCORE: NO FURTHER SCREENING NEEDED
SUM OF ALL RESPONSES TO PHQ9 QUESTIONS 1 AND 2: 0
1. LITTLE INTEREST OR PLEASURE IN DOING THINGS: NOT AT ALL
2. FEELING DOWN, DEPRESSED OR HOPELESS: NOT AT ALL
SUM OF ALL RESPONSES TO PHQ9 QUESTIONS 1 AND 2: 0

## 2021-05-14 ENCOUNTER — TELEPHONE (OUTPATIENT)
Dept: CARDIOLOGY | Age: 81
End: 2021-05-14

## 2021-05-19 LAB
ANION GAP SERPL CALC-SCNC: 7 MMOL/L (ref 8–16)
BUN SERPL-MCNC: 25 MG/DL (ref 7–25)
BUN/CREAT SERPL: 34 (ref 7–23)
CALCIUM SERPL-MCNC: 9.8 MG/DL (ref 8.6–10.4)
CHLORIDE SERPL-SCNC: 93 MEQ/L (ref 98–107)
CO2 SERPL-SCNC: 37 MEQ/L (ref 21–31)
CREAT SERPL-MCNC: 0.73 MG/DL (ref 0.7–1.3)
GLUCOSE SERPL-MCNC: 136 MG/DL (ref 70–99)
LENGTH OF FAST TIME PATIENT: ABNORMAL H
POTASSIUM SERPL-SCNC: 3.5 MMOL/L (ref 3.5–5.1)
SODIUM SERPL-SCNC: 137 MEQ/L (ref 133–144)

## 2021-05-21 ENCOUNTER — TELEPHONE (OUTPATIENT)
Dept: CARDIOLOGY | Age: 81
End: 2021-05-21

## 2021-05-21 RX ORDER — METOLAZONE 2.5 MG/1
2.5 TABLET ORAL EVERY OTHER DAY
Qty: 90 TABLET | Refills: 3 | Status: SHIPPED | COMMUNITY
Start: 2021-05-21 | End: 2022-04-29 | Stop reason: SDUPTHER

## 2021-05-21 RX ORDER — POTASSIUM CHLORIDE 20 MEQ/1
TABLET, EXTENDED RELEASE ORAL
Qty: 540 TABLET | Refills: 3 | Status: SHIPPED | OUTPATIENT
Start: 2021-05-21 | End: 2021-11-09 | Stop reason: SDUPTHER

## 2021-06-10 PROBLEM — D72.819 LEUKOPENIA: Status: ACTIVE | Noted: 2021-06-01

## 2021-09-15 RX ORDER — METOPROLOL TARTRATE 50 MG/1
TABLET, FILM COATED ORAL
Qty: 180 TABLET | Refills: 0 | Status: SHIPPED | OUTPATIENT
Start: 2021-09-15 | End: 2021-12-23

## 2021-09-15 RX ORDER — METOPROLOL TARTRATE 50 MG/1
TABLET, FILM COATED ORAL
Qty: 180 TABLET | Refills: 0 | Status: SHIPPED | OUTPATIENT
Start: 2021-09-15 | End: 2021-09-24 | Stop reason: SDUPTHER

## 2021-09-18 LAB
APPEARANCE UR: CLEAR
BILIRUB UR QL: NEGATIVE
COLOR UR: YELLOW
GLUCOSE UR-MCNC: NEGATIVE MG/DL
HGB UR QL: NEGATIVE
KETONES UR-MCNC: NEGATIVE MG/DL
LEUKOCYTE ESTERASE UR QL STRIP: NEGATIVE
NITRITE UR QL: NEGATIVE
PH UR: 6.5 [PH] (ref 5–7.5)
PROT UR QL: NEGATIVE
SP GR UR: 1.01 (ref 1–1.03)
UROBILINOGEN UR QL: 0.2 MG/DL (ref 0.2–1)

## 2021-09-21 ENCOUNTER — EXTERNAL RECORD (OUTPATIENT)
Dept: HEALTH INFORMATION MANAGEMENT | Facility: OTHER | Age: 81
End: 2021-09-21

## 2021-09-21 LAB
ALBUMIN SERPL-MCNC: 4.7 G/DL (ref 3.5–5.2)
ALP SERPL-CCNC: 225 U/L (ref 45–117)
ALT SERPL-CCNC: 19 U/L (ref 0–41)
AST SERPL-CCNC: 22 U/L (ref 0–40)
BILIRUB SERPL-MCNC: 0.47 MG/DL (ref 0–1.2)
BUN SERPL-MCNC: 27 MG/DL (ref 6–20)
BUN/CREAT SERPL: 35 (ref 10–20)
CALCIUM SERPL-MCNC: 9.7 MG/DL (ref 8.6–10.3)
CHLORIDE SERPL-SCNC: 90 MMOL/L (ref 98–107)
CHOLEST SERPL-MCNC: 179 MG/DL
CHOLEST/HDLC SERPL: 2 RATIO (ref 0–5)
CO2 SERPL-SCNC: 34.5 MMOL/L (ref 22–29)
CREAT SERPL-MCNC: 0.78 MG/DL (ref 0.7–1.2)
GLUCOSE SERPL-MCNC: 100 MG/DL (ref 74–109)
HDLC SERPL-MCNC: 81 MG/DL
LENGTH OF FAST TIME PATIENT: YES H
LENGTH OF FAST TIME PATIENT: YES H
POTASSIUM SERPL-SCNC: 3.26 MMOL/L (ref 3.5–5.1)
PROT SERPL-MCNC: 7.3 G/DL (ref 6.4–8.3)
SODIUM SERPL-SCNC: 139 MMOL/L (ref 136–145)
TRIGL SERPL-MCNC: 63 MG/DL
VLDLC SERPL CALC-MCNC: 13 MG/DL

## 2021-09-24 ENCOUNTER — OFFICE VISIT (OUTPATIENT)
Dept: CARDIOLOGY | Age: 81
End: 2021-09-24

## 2021-09-24 VITALS
DIASTOLIC BLOOD PRESSURE: 65 MMHG | SYSTOLIC BLOOD PRESSURE: 110 MMHG | WEIGHT: 182.2 LBS | BODY MASS INDEX: 23.38 KG/M2 | OXYGEN SATURATION: 95 % | HEART RATE: 78 BPM | HEIGHT: 74 IN

## 2021-09-24 DIAGNOSIS — E78.2 HYPERLIPIDEMIA, MIXED: ICD-10-CM

## 2021-09-24 DIAGNOSIS — I48.21 PERMANENT ATRIAL FIBRILLATION (CMD): Primary | ICD-10-CM

## 2021-09-24 DIAGNOSIS — Z95.2 S/P AVR (AORTIC VALVE REPLACEMENT) AND AORTOPLASTY: ICD-10-CM

## 2021-09-24 DIAGNOSIS — I10 ESSENTIAL HYPERTENSION: ICD-10-CM

## 2021-09-24 DIAGNOSIS — I27.20 PULMONARY HTN (CMD): ICD-10-CM

## 2021-09-24 PROCEDURE — 99214 OFFICE O/P EST MOD 30 MIN: CPT | Performed by: INTERNAL MEDICINE

## 2021-09-24 ASSESSMENT — ENCOUNTER SYMPTOMS
BRUISES/BLEEDS EASILY: 0
CHILLS: 0
HEMATEMESIS: 0
SUSPICIOUS LESIONS: 0
WEIGHT GAIN: 0
HEMOPTYSIS: 0
COUGH: 0
FEVER: 0
HEMATOCHEZIA: 0
LIGHT-HEADEDNESS: 0
SHORTNESS OF BREATH: 0
WEIGHT LOSS: 0

## 2021-10-06 DIAGNOSIS — I10 ESSENTIAL HYPERTENSION: ICD-10-CM

## 2021-10-07 ENCOUNTER — TELEPHONE (OUTPATIENT)
Dept: CARDIOLOGY | Age: 81
End: 2021-10-07

## 2021-11-09 ENCOUNTER — TELEPHONE (OUTPATIENT)
Dept: CARDIOLOGY | Age: 81
End: 2021-11-09

## 2021-11-09 RX ORDER — POTASSIUM CHLORIDE 20 MEQ/1
40 TABLET, EXTENDED RELEASE ORAL 3 TIMES DAILY
Qty: 540 TABLET | Refills: 3 | Status: SHIPPED | OUTPATIENT
Start: 2021-11-09 | End: 2021-11-12 | Stop reason: DRUGHIGH

## 2021-11-12 RX ORDER — POTASSIUM CHLORIDE 20 MEQ/1
40 TABLET, EXTENDED RELEASE ORAL 3 TIMES DAILY
Qty: 630 TABLET | Refills: 3 | Status: SHIPPED | OUTPATIENT
Start: 2021-11-12 | End: 2021-11-15 | Stop reason: SDUPTHER

## 2021-11-15 RX ORDER — POTASSIUM CHLORIDE 20 MEQ/1
40 TABLET, EXTENDED RELEASE ORAL 3 TIMES DAILY
Qty: 630 TABLET | Refills: 3 | Status: SHIPPED | OUTPATIENT
Start: 2021-11-15 | End: 2021-11-18 | Stop reason: SDUPTHER

## 2021-11-17 ENCOUNTER — TELEPHONE (OUTPATIENT)
Dept: CARDIOLOGY | Age: 81
End: 2021-11-17

## 2021-11-17 DIAGNOSIS — E87.6 HYPOKALEMIA: Primary | ICD-10-CM

## 2021-11-17 DIAGNOSIS — E87.6 HYPOKALEMIA: ICD-10-CM

## 2021-11-17 LAB
ANION GAP SERPL CALC-SCNC: 5 MMOL/L (ref 8–16)
BUN SERPL-MCNC: 27 MG/DL (ref 7–25)
BUN/CREAT SERPL: 31
CALCIUM SERPL-MCNC: 9.7 MG/DL (ref 8.6–10.4)
CHLORIDE SERPL-SCNC: 97 MEQ/L (ref 98–107)
CO2 SERPL-SCNC: 38 MEQ/L (ref 21–31)
CREAT SERPL-MCNC: 0.88 MG/DL (ref 0.7–1.3)
EST CRCL: ABNORMAL ML/MIN
GLUCOSE SERPL-MCNC: 120 MG/DL (ref 70–99)
LENGTH OF FAST TIME PATIENT: ABNORMAL H
POTASSIUM SERPL-SCNC: 3.4 MMOL/L (ref 3.5–5.1)
SODIUM SERPL-SCNC: 140 MEQ/L (ref 133–144)

## 2021-11-18 RX ORDER — POTASSIUM CHLORIDE 20 MEQ/1
TABLET, EXTENDED RELEASE ORAL
Qty: 450 TABLET | Refills: 3 | Status: SHIPPED | OUTPATIENT
Start: 2021-11-18

## 2021-11-29 ENCOUNTER — TELEPHONE (OUTPATIENT)
Dept: CARDIOLOGY | Age: 81
End: 2021-11-29

## 2021-11-29 DIAGNOSIS — E87.6 HYPOKALEMIA: Primary | ICD-10-CM

## 2021-11-29 DIAGNOSIS — E87.6 HYPOKALEMIA: ICD-10-CM

## 2021-11-29 RX ORDER — SPIRONOLACTONE 25 MG/1
25 TABLET ORAL 2 TIMES DAILY
Status: SHIPPED | COMMUNITY
Start: 2021-11-29 | End: 2022-01-06 | Stop reason: SDUPTHER

## 2021-12-10 ENCOUNTER — TELEPHONE (OUTPATIENT)
Dept: CARDIOLOGY | Age: 81
End: 2021-12-10

## 2021-12-23 RX ORDER — METOPROLOL TARTRATE 50 MG/1
TABLET, FILM COATED ORAL
Qty: 180 TABLET | Refills: 0 | Status: SHIPPED | OUTPATIENT
Start: 2021-12-23 | End: 2022-03-28

## 2022-01-01 ENCOUNTER — EXTERNAL RECORD (OUTPATIENT)
Dept: OTHER | Age: 82
End: 2022-01-01

## 2022-01-06 ENCOUNTER — TELEPHONE (OUTPATIENT)
Dept: CARDIOLOGY | Age: 82
End: 2022-01-06

## 2022-01-06 RX ORDER — SPIRONOLACTONE 25 MG/1
25 TABLET ORAL DAILY
Qty: 135 TABLET | Refills: 3 | Status: SHIPPED | OUTPATIENT
Start: 2022-01-06 | End: 2022-01-07 | Stop reason: SDUPTHER

## 2022-01-07 RX ORDER — SPIRONOLACTONE 25 MG/1
TABLET ORAL
Qty: 135 TABLET | Refills: 3 | Status: SHIPPED | OUTPATIENT
Start: 2022-01-07 | End: 2022-01-19 | Stop reason: DRUGHIGH

## 2022-01-18 LAB
ALBUMIN SERPL-MCNC: 4.7 G/DL (ref 3.5–5.2)
ALP SERPL-CCNC: 191 U/L (ref 45–117)
ALT SERPL-CCNC: 20 U/L (ref 0–41)
AST SERPL-CCNC: 23 U/L (ref 0–40)
BILIRUB SERPL-MCNC: 0.48 MG/DL (ref 0–1.2)
BUN SERPL-MCNC: 25 MG/DL (ref 6–20)
BUN/CREAT SERPL: 33 (ref 10–20)
CALCIUM SERPL-MCNC: 9.6 MG/DL (ref 8.6–10.3)
CHLORIDE SERPL-SCNC: 94 MMOL/L (ref 98–107)
CHOLEST SERPL-MCNC: 183 MG/DL
CHOLEST/HDLC SERPL: 2 RATIO (ref 0–5)
CO2 SERPL-SCNC: 35.1 MMOL/L (ref 22–29)
CREAT SERPL-MCNC: 0.75 MG/DL (ref 0.7–1.2)
GLUCOSE SERPL-MCNC: 111 MG/DL (ref 74–109)
HDLC SERPL-MCNC: 87 MG/DL
LDLC SERPL CALC-MCNC: 86 MG/DL
LENGTH OF FAST TIME PATIENT: YES H
LENGTH OF FAST TIME PATIENT: YES H
POTASSIUM SERPL-SCNC: 3.24 MMOL/L (ref 3.5–5.1)
PROT SERPL-MCNC: 7.4 G/DL (ref 6.4–8.3)
SODIUM SERPL-SCNC: 140 MMOL/L (ref 136–145)
TRIGL SERPL-MCNC: 49 MG/DL
VLDLC SERPL CALC-MCNC: 10 MG/DL

## 2022-01-19 ENCOUNTER — TELEPHONE (OUTPATIENT)
Dept: CARDIOLOGY | Age: 82
End: 2022-01-19

## 2022-01-19 DIAGNOSIS — Z51.81 ENCOUNTER FOR THERAPEUTIC DRUG MONITORING: Primary | ICD-10-CM

## 2022-01-19 LAB
APPEARANCE UR: CLEAR
BILIRUB UR QL: NEGATIVE
COLOR UR: YELLOW
GLUCOSE UR-MCNC: NEGATIVE MG/DL
HGB UR QL: NEGATIVE
KETONES UR-MCNC: NEGATIVE MG/DL
LEUKOCYTE ESTERASE UR QL STRIP: NEGATIVE
NITRITE UR QL: NEGATIVE
PH UR: 7 [PH] (ref 5–7.5)
PROT UR QL: NEGATIVE
SP GR UR: 1.01 (ref 1–1.03)
UROBILINOGEN UR QL: 0.2 MG/DL (ref 0.2–1)

## 2022-01-19 RX ORDER — SPIRONOLACTONE 25 MG/1
25 TABLET ORAL 2 TIMES DAILY
Qty: 180 TABLET | Refills: 3 | Status: SHIPPED | OUTPATIENT
Start: 2022-01-19 | End: 2022-03-16 | Stop reason: SDUPTHER

## 2022-01-31 ENCOUNTER — TELEPHONE (OUTPATIENT)
Dept: CARDIOLOGY | Age: 82
End: 2022-01-31

## 2022-03-16 ENCOUNTER — TELEPHONE (OUTPATIENT)
Dept: CARDIOLOGY | Age: 82
End: 2022-03-16

## 2022-03-16 RX ORDER — SPIRONOLACTONE 25 MG/1
25 TABLET ORAL 2 TIMES DAILY
Qty: 180 TABLET | Refills: 0 | Status: SHIPPED | OUTPATIENT
Start: 2022-03-16

## 2022-03-25 ENCOUNTER — TELEPHONE (OUTPATIENT)
Dept: CARDIOLOGY | Age: 82
End: 2022-03-25

## 2022-03-28 ENCOUNTER — APPOINTMENT (OUTPATIENT)
Dept: CARDIOLOGY | Age: 82
End: 2022-03-28

## 2022-03-28 RX ORDER — METOPROLOL TARTRATE 50 MG/1
TABLET, FILM COATED ORAL
Qty: 180 TABLET | Refills: 0 | Status: SHIPPED | OUTPATIENT
Start: 2022-03-28

## 2022-04-29 RX ORDER — METOLAZONE 2.5 MG/1
2.5 TABLET ORAL EVERY OTHER DAY
Qty: 90 TABLET | Refills: 3 | Status: SHIPPED | OUTPATIENT
Start: 2022-04-29 | End: 2023-09-26 | Stop reason: SDUPTHER

## 2022-04-29 RX ORDER — FOLIC ACID 1 MG/1
1 TABLET ORAL DAILY
Qty: 90 TABLET | Refills: 3 | Status: SHIPPED | OUTPATIENT
Start: 2022-04-29

## 2022-04-29 RX ORDER — FUROSEMIDE 80 MG
80 TABLET ORAL 2 TIMES DAILY
Qty: 180 TABLET | Refills: 3 | Status: SHIPPED | OUTPATIENT
Start: 2022-04-29

## 2022-04-29 RX ORDER — DIGOXIN 125 MCG
125 TABLET ORAL DAILY
Qty: 90 TABLET | Refills: 3 | Status: SHIPPED | OUTPATIENT
Start: 2022-04-29

## 2023-04-04 RX ORDER — FUROSEMIDE 80 MG
TABLET ORAL
Qty: 180 TABLET | Refills: 3 | OUTPATIENT
Start: 2023-04-04

## 2023-04-04 RX ORDER — FOLIC ACID 1 MG/1
1 TABLET ORAL DAILY
Qty: 90 TABLET | Refills: 3 | OUTPATIENT
Start: 2023-04-04

## 2023-09-26 RX ORDER — METOLAZONE 2.5 MG/1
2.5 TABLET ORAL EVERY OTHER DAY
Qty: 90 TABLET | Refills: 3 | Status: SHIPPED | OUTPATIENT
Start: 2023-09-26

## (undated) NOTE — LETTER
[address] [facility]    Consent for Coronary CT Angiography    Your doctor has recommended that you have a Coronary CT Angiography procedure.  Coronary CT Angiography is a diagnostic procedure using computed tomography to scan the patient when taking medication. Allergic reactions to medication can range from very minor to very serious leading to a life threatening situation or even death. Please be sure to communicate any allergy you may have to your caregiver immediately.     The i